# Patient Record
Sex: MALE | Race: WHITE | HISPANIC OR LATINO | Employment: FULL TIME | URBAN - METROPOLITAN AREA
[De-identification: names, ages, dates, MRNs, and addresses within clinical notes are randomized per-mention and may not be internally consistent; named-entity substitution may affect disease eponyms.]

---

## 2018-08-27 ENCOUNTER — HOSPITAL ENCOUNTER (EMERGENCY)
Facility: HOSPITAL | Age: 48
Discharge: HOME | End: 2018-08-27
Attending: EMERGENCY MEDICINE

## 2018-08-27 VITALS
RESPIRATION RATE: 19 BRPM | SYSTOLIC BLOOD PRESSURE: 111 MMHG | DIASTOLIC BLOOD PRESSURE: 75 MMHG | BODY MASS INDEX: 33.06 KG/M2 | WEIGHT: 205.69 LBS | HEART RATE: 76 BPM | TEMPERATURE: 98 F | HEIGHT: 66 IN | OXYGEN SATURATION: 98 %

## 2018-08-27 DIAGNOSIS — R07.9 CHEST PAIN: ICD-10-CM

## 2018-08-27 LAB
ALBUMIN SERPL BCP-MCNC: 3.8 G/DL
ALP SERPL-CCNC: 72 U/L
ALT SERPL W/O P-5'-P-CCNC: 41 U/L
ANION GAP SERPL CALC-SCNC: 13 MMOL/L
AST SERPL-CCNC: 18 U/L
BASOPHILS # BLD AUTO: 0.05 K/UL
BASOPHILS NFR BLD: 0.5 %
BILIRUB SERPL-MCNC: 0.6 MG/DL
BUN SERPL-MCNC: 13 MG/DL
CALCIUM SERPL-MCNC: 9.4 MG/DL
CHLORIDE SERPL-SCNC: 106 MMOL/L
CO2 SERPL-SCNC: 20 MMOL/L
CREAT SERPL-MCNC: 0.8 MG/DL
DIFFERENTIAL METHOD: NORMAL
EOSINOPHIL # BLD AUTO: 0.5 K/UL
EOSINOPHIL NFR BLD: 4.8 %
ERYTHROCYTE [DISTWIDTH] IN BLOOD BY AUTOMATED COUNT: 13.5 %
EST. GFR  (AFRICAN AMERICAN): >60 ML/MIN/1.73 M^2
EST. GFR  (NON AFRICAN AMERICAN): >60 ML/MIN/1.73 M^2
GLUCOSE SERPL-MCNC: 140 MG/DL
HCT VFR BLD AUTO: 42 %
HGB BLD-MCNC: 14.5 G/DL
LYMPHOCYTES # BLD AUTO: 4.5 K/UL
LYMPHOCYTES NFR BLD: 41.3 %
MCH RBC QN AUTO: 29 PG
MCHC RBC AUTO-ENTMCNC: 34.5 G/DL
MCV RBC AUTO: 84 FL
MONOCYTES # BLD AUTO: 0.8 K/UL
MONOCYTES NFR BLD: 7 %
NEUTROPHILS # BLD AUTO: 5.1 K/UL
NEUTROPHILS NFR BLD: 46.4 %
PLATELET # BLD AUTO: 273 K/UL
PMV BLD AUTO: 9.2 FL
POTASSIUM SERPL-SCNC: 3.5 MMOL/L
PROT SERPL-MCNC: 7 G/DL
RBC # BLD AUTO: 5 M/UL
SODIUM SERPL-SCNC: 139 MMOL/L
TROPONIN I SERPL DL<=0.01 NG/ML-MCNC: <0.006 NG/ML
WBC # BLD AUTO: 10.95 K/UL

## 2018-08-27 PROCEDURE — 80053 COMPREHEN METABOLIC PANEL: CPT

## 2018-08-27 PROCEDURE — 93010 ELECTROCARDIOGRAM REPORT: CPT | Mod: ,,, | Performed by: INTERNAL MEDICINE

## 2018-08-27 PROCEDURE — 93005 ELECTROCARDIOGRAM TRACING: CPT

## 2018-08-27 PROCEDURE — 36415 COLL VENOUS BLD VENIPUNCTURE: CPT

## 2018-08-27 PROCEDURE — 25000003 PHARM REV CODE 250: Performed by: EMERGENCY MEDICINE

## 2018-08-27 PROCEDURE — 85025 COMPLETE CBC W/AUTO DIFF WBC: CPT

## 2018-08-27 PROCEDURE — 84484 ASSAY OF TROPONIN QUANT: CPT

## 2018-08-27 PROCEDURE — 99284 EMERGENCY DEPT VISIT MOD MDM: CPT | Mod: 25

## 2018-08-27 RX ORDER — ASPIRIN 325 MG
325 TABLET ORAL
Status: COMPLETED | OUTPATIENT
Start: 2018-08-27 | End: 2018-08-27

## 2018-08-27 RX ADMIN — ASPIRIN 325 MG ORAL TABLET 325 MG: 325 PILL ORAL at 02:08

## 2018-08-27 NOTE — ED NOTES
"Was seen at [facility] on Wednesday for c/o chest "tingling" and was diagnosed with URI. This evening began experiencing same symptoms but now with tingling down left arm. Bilat hand grasps strong.  Denies nausea diaphoresis or SOB. NSR on monitor. EKG in progress.  Dr. Alas at bedside.  "

## 2018-08-27 NOTE — ED PROVIDER NOTES
"SCRIBE #1 NOTE: I, Angie Phillip, am scribing for, and in the presence of, Krista Alas MD. I have scribed the entire note.      History      Chief Complaint   Patient presents with    States chest tingling     States for 2 days has had left sided tingling to chest, denies SOB, n/v. States left arm got tingly tonight as well.  Also had tingling pain to right thigh        Review of patient's allergies indicates:  No Known Allergies     HPI   HPI    8/27/2018, 1:56 AM   History obtained from the patient      History of Present Illness: Lv Joyce is a 48 y.o. male patient who presents to the Emergency Department for L sided chest tingling which onset gradually a few days ago. Patient reports being evaluated at [facility] 5 days ago for similar sxs and received steroid injection. Symptoms are constant and moderate in severity. The patient describes the sxs as "funny feeling". Sxs mitigated with movement. No exacerbating factors reported. Patient reports L arm tingling onset tonight. Patient denies any fever, chills, SOB, diaphoresis, palpitations, extremity weakness/numbness, leg swelling, dizziness, cough, N/V, recent travel, long car rides, and all other sxs at this time. No further complaints or concerns at this time.     Arrival mode: Personal vehicle     PCP: Primary Doctor No       Past Medical History:  Past medical history reviewed not relevant      Past Surgical History:  Past surgical history reviewed not relevant      Family History:  Family history reviewed not relevant      Social History:  Social History    Social History Main Topics    Social History Main Topics    Smoking status: Unknown if ever smoked    Smokeless tobacco: Unknown if ever used    Alcohol Use: Unknown drinking history    Drug Use: Unknown if ever used    Sexual Activity: Unknown       ROS   Review of Systems   Constitutional: Negative for chills, diaphoresis and fever.   HENT: Negative for sore throat.    Respiratory: Negative for " cough and shortness of breath.    Cardiovascular: Positive for chest pain (tingling). Negative for palpitations and leg swelling.   Gastrointestinal: Negative for nausea and vomiting.   Genitourinary: Negative for dysuria.   Musculoskeletal: Negative for back pain.   Skin: Negative for rash.   Neurological: Negative for dizziness, weakness and numbness.        (+) L arm tingling   Hematological: Does not bruise/bleed easily.   All other systems reviewed and are negative.      Physical Exam      Initial Vitals [08/27/18 0142]   BP Pulse Resp Temp SpO2   (!) 155/99 82 16 97.6 °F (36.4 °C) 99 %      MAP       --          Physical Exam  Nursing Notes and Vital Signs Reviewed.  Constitutional: Patient is in no acute distress. Well-developed and well-nourished.  Head: Atraumatic. Normocephalic.  Eyes: PERRL. EOM intact. Conjunctivae are not pale. No scleral icterus.  ENT: Mucous membranes are moist. Oropharynx is clear and symmetric.    Neck: Supple. Full ROM. No lymphadenopathy.  Cardiovascular: Regular rate. Regular rhythm. No murmurs, rubs, or gallops. Distal pulses are 2+ and symmetric.  Pulmonary/Chest: No respiratory distress. Clear to auscultation bilaterally. No wheezing or rales.  Abdominal: Soft and non-distended.  There is no tenderness.  No rebound, guarding, or rigidity. Good bowel sounds.  Genitourinary: No CVA tenderness  Musculoskeletal: Moves all extremities. No obvious deformities. No edema. No calf tenderness.  Skin: Warm and dry.  Neurological:  Alert, awake, and appropriate.  Normal speech.  No acute focal neurological deficits are appreciated.  Psychiatric: Normal affect. Good eye contact. Appropriate in content.    ED Course    Procedures  ED Vital Signs:  Vitals:    08/27/18 0142 08/27/18 0316 08/27/18 0331 08/27/18 0335   BP: (!) 155/99 123/86  111/75   Pulse: 82 74  76   Resp: 16 20 19    Temp: 97.6 °F (36.4 °C)      TempSrc: Oral      SpO2: 99% 98%  98%   Weight: 93.3 kg (205 lb 11 oz)     "  Height: 5' 6" (1.676 m)       08/27/18 0339   BP:    Pulse:    Resp:    Temp: 98.1 °F (36.7 °C)   TempSrc:    SpO2:    Weight:    Height:        Abnormal Lab Results:  Labs Reviewed   COMPREHENSIVE METABOLIC PANEL - Abnormal; Notable for the following components:       Result Value    CO2 20 (*)     Glucose 140 (*)     All other components within normal limits   CBC W/ AUTO DIFFERENTIAL   TROPONIN I        All Lab Results:  Results for orders placed or performed during the hospital encounter of 08/27/18   CBC auto differential   Result Value Ref Range    WBC 10.95 3.90 - 12.70 K/uL    RBC 5.00 4.60 - 6.20 M/uL    Hemoglobin 14.5 14.0 - 18.0 g/dL    Hematocrit 42.0 40.0 - 54.0 %    MCV 84 82 - 98 fL    MCH 29.0 27.0 - 31.0 pg    MCHC 34.5 32.0 - 36.0 g/dL    RDW 13.5 11.5 - 14.5 %    Platelets 273 150 - 350 K/uL    MPV 9.2 9.2 - 12.9 fL    Gran # (ANC) 5.1 1.8 - 7.7 K/uL    Lymph # 4.5 1.0 - 4.8 K/uL    Mono # 0.8 0.3 - 1.0 K/uL    Eos # 0.5 0.0 - 0.5 K/uL    Baso # 0.05 0.00 - 0.20 K/uL    Gran% 46.4 38.0 - 73.0 %    Lymph% 41.3 18.0 - 48.0 %    Mono% 7.0 4.0 - 15.0 %    Eosinophil% 4.8 0.0 - 8.0 %    Basophil% 0.5 0.0 - 1.9 %    Differential Method Automated    Comprehensive metabolic panel   Result Value Ref Range    Sodium 139 136 - 145 mmol/L    Potassium 3.5 3.5 - 5.1 mmol/L    Chloride 106 95 - 110 mmol/L    CO2 20 (L) 23 - 29 mmol/L    Glucose 140 (H) 70 - 110 mg/dL    BUN, Bld 13 6 - 20 mg/dL    Creatinine 0.8 0.5 - 1.4 mg/dL    Calcium 9.4 8.7 - 10.5 mg/dL    Total Protein 7.0 6.0 - 8.4 g/dL    Albumin 3.8 3.5 - 5.2 g/dL    Total Bilirubin 0.6 0.1 - 1.0 mg/dL    Alkaline Phosphatase 72 55 - 135 U/L    AST 18 10 - 40 U/L    ALT 41 10 - 44 U/L    Anion Gap 13 8 - 16 mmol/L    eGFR if African American >60 >60 mL/min/1.73 m^2    eGFR if non African American >60 >60 mL/min/1.73 m^2   Troponin I #1   Result Value Ref Range    Troponin I <0.006 0.000 - 0.026 ng/mL       Imaging Results:  Imaging Results          " X-Ray Chest AP Portable (In process)                Ordered, reviewed, and independently interpreted by the ED provider.  Study: X-ray Chest  Findings: NAF    The EKG was ordered, reviewed, and independently interpreted by the ED provider.  Interpretation time: 0200  Rate: 69 BPM  Rhythm: normal sinus rhythm  Interpretation: Normal ECG. No acute ST & T wave abnormality. No STEMI.         The Emergency Provider reviewed the vital signs and test results, which are outlined above.    ED Discussion     3:00 AM: Reassessed pt at this time. Discussed with pt all pertinent ED information and results. Discussed pt dx and plan of tx. Gave pt all f/u and return to the ED instructions. All questions and concerns were addressed at this time. Pt expresses understanding of information and instructions, and is comfortable with plan to discharge. Pt is stable for discharge.  Discussed with patient to f/u with PCP for re-evaluation.    I have discussed with patient and/or family/caretaker chest pain precautions, specifically to return for worsening chest pain, shortness of breath, fever, or any concern.  I have low suspicion for cardiopulmonary, vascular, infectious, respiratory, or other emergent medical condition based on my evaluation in the ED.    I discussed with patient and/or family/caretaker that evaluation in the ED does not suggest any emergent or life threatening medical conditions requiring immediate intervention beyond what was provided in the ED, and I believe patient is safe for discharge.  Regardless, an unremarkable evaluation in the ED does not preclude the development or presence of a serious of life threatening condition. As such, patient was instructed to return immediately for any worsening or change in current symptoms.      ED Medication(s):  Medications   aspirin tablet 325 mg (325 mg Oral Given 8/27/18 0216)       There are no discharge medications for this patient.      Follow-up Information     [facility] In 2 days.    Contact information:  [address] [facility] [address]  [phone]             [facility]    Specialty:  Emergency Medicine  Why:  As needed, If symptoms worsen  Contact information:  [address] [facility] [address] [phone]                   Medical Decision Making    Medical Decision Making:   Clinical Tests:   Lab Tests: Ordered and Reviewed  Radiological Study: Ordered and Reviewed  Medical Tests: Ordered and Reviewed           Scribe Attestation:   Scribe #1: I performed the above scribed service and the documentation accurately describes the services I performed. I attest to the accuracy of the note.    Attending:   Physician Attestation Statement for Scribe #1: I, Krista Alas MD, personally performed the services described in this documentation, as scribed by Angie Fisher, in my presence, and it is both accurate and complete.          Clinical Impression       ICD-10-CM ICD-9-CM   1. Chest pain R07.9 786.50       Disposition:   Disposition: Discharged  Condition: Stable         Krista Alas MD  08/27/18 0604

## 2018-10-12 ENCOUNTER — HOSPITAL ENCOUNTER (EMERGENCY)
Facility: HOSPITAL | Age: 48
Discharge: HOME | End: 2018-10-12
Attending: EMERGENCY MEDICINE

## 2018-10-12 VITALS
HEIGHT: 66 IN | DIASTOLIC BLOOD PRESSURE: 80 MMHG | HEART RATE: 80 BPM | SYSTOLIC BLOOD PRESSURE: 143 MMHG | RESPIRATION RATE: 18 BRPM | TEMPERATURE: 98 F | WEIGHT: 208.13 LBS | OXYGEN SATURATION: 98 % | BODY MASS INDEX: 33.45 KG/M2

## 2018-10-12 DIAGNOSIS — R05.9 COUGH: ICD-10-CM

## 2018-10-12 DIAGNOSIS — I10 HTN (HYPERTENSION): Primary | ICD-10-CM

## 2018-10-12 PROCEDURE — 99284 EMERGENCY DEPT VISIT MOD MDM: CPT | Mod: 25

## 2018-10-12 PROCEDURE — 93010 ELECTROCARDIOGRAM REPORT: CPT | Mod: ,,, | Performed by: INTERNAL MEDICINE

## 2018-10-13 NOTE — ED PROVIDER NOTES
SCRIBE #1 NOTE: I, Gracia Pinzon, am scribing for, and in the presence of, MARY Moise. I have scribed the entire note.      History      Chief Complaint   Patient presents with    Hypertension     Pt states that earlier this week he was seen at urgent care for HTN due to new toothpaste he tried. Pt states that he checked his BP today at 1830 and it was 146/112       Review of patient's allergies indicates:  No Known Allergies     HPI   HPI    10/12/2018, 8:15 PM   History obtained from the patient      History of Present Illness: Lv oJyce is a 48 y.o. male patient who presents to the Emergency Department for evaluation of elevated BP which onset gradually today. Patient states he took his BP throughout the day when it continued to increase. Symptoms are intermittent and mild in severity. No mitigating or exacerbating factors reported. Associated sxs include cough and mild chest tightness. Patient denies any fatigue, HA, palpitations, congestion, fever, diaphoresis, N/V/D, dizziness, blurred vision, CP, SOB, and all other sxs at this time. No prior Tx reported. Pt notes mother and father have mild HTN. Pt is not currently on any BP medications. No further complaints or concerns at this time.     Arrival mode: Personal vehicle    PCP: Primary Doctor No       Past Medical History:  Past medical history reviewed not relevant      Past Surgical History:  Past surgical history reviewed not relevant      Family History:  Family history reviewed not relevant      Social History:  Social History    Social History Main Topics    Social History Main Topics    Smoking status: Unknown if ever smoked    Smokeless tobacco: Unknown if ever used    Alcohol Use: Unknown drinking history    Drug Use: Unknown if ever used    Sexual Activity: Unknown         ROS   Review of Systems   Constitutional: Negative for activity change, chills, diaphoresis, fatigue and fever.        (+) elevated BP     HENT:  Negative for congestion, rhinorrhea, sneezing, sore throat and trouble swallowing.    Eyes: Negative for pain.        (-) blurred vision   Respiratory: Positive for cough and chest tightness (mild). Negative for shortness of breath, wheezing and stridor.    Cardiovascular: Negative for chest pain, palpitations and leg swelling.   Gastrointestinal: Negative for abdominal distention, abdominal pain, constipation, diarrhea, nausea and vomiting.   Genitourinary: Negative for difficulty urinating, dysuria, frequency and urgency.   Musculoskeletal: Negative for arthralgias, back pain, myalgias, neck pain and neck stiffness.   Skin: Negative for pallor, rash and wound.   Neurological: Negative for dizziness, syncope, weakness, light-headedness, numbness and headaches.   Hematological: Does not bruise/bleed easily.   All other systems reviewed and are negative.    Physical Exam      Initial Vitals [10/12/18 1919]   BP Pulse Resp Temp SpO2   (!) 158/80 90 18 97.8 °F (36.6 °C) 97 %      MAP       --          Physical Exam  Nursing Notes and Vital Signs Reviewed.  Constitutional: Patient is in no acute distress. Well-developed and well-nourished.  Head: Atraumatic. Normocephalic.  Eyes: PERRL. EOM intact. Conjunctivae are not pale. No scleral icterus.  ENT: Mucous membranes are moist. Oropharynx is clear and symmetric.    Neck: Supple. Full ROM. No lymphadenopathy.  Cardiovascular: Regular rate. Regular rhythm. No murmurs, rubs, or gallops. Distal pulses are 2+ and symmetric.  Pulmonary/Chest: No respiratory distress. Clear to auscultation bilaterally. No wheezing or rales.  Abdominal: Soft and non-distended.  There is no tenderness.  No rebound, guarding, or rigidity. Good bowel sounds.  Genitourinary: No CVA tenderness  Musculoskeletal: Moves all extremities. No obvious deformities. No edema. No calf tenderness.  Skin: Warm and dry.  Neurological:  Alert, awake, and appropriate.  Normal speech.  No acute focal neurological  "deficits are appreciated.  Psychiatric: Normal affect. Good eye contact. Appropriate in content.    ED Course    Procedures  ED Vital Signs:  Vitals:    10/12/18 1919   BP: (!) 158/80   Pulse: 90   Resp: 18   Temp: 97.8 °F (36.6 °C)   TempSrc: Oral   SpO2: 97%   Weight: 94.4 kg (208 lb 1.8 oz)   Height: 5' 6" (1.676 m)       Abnormal Lab Results:  Labs Reviewed - No data to display     All Lab Results:  None    Imaging Results:  Imaging Results          X-Ray Chest PA And Lateral (Final result)  Result time 10/12/18 21:21:32    Final result by Sp Bailey III, MD (10/12/18 21:21:32)                 Impression:      No acute disease identified in the chest.      Electronically signed by: Sp Bailey MD  Date:    10/12/2018  Time:    21:21             Narrative:    EXAMINATION:  XR CHEST PA AND LATERAL    CLINICAL HISTORY:  Cough    COMPARISON:  08/27/2018    FINDINGS:  The cardiomediastinal silhouette is within normal limits.  The lungs appear clear of active disease.  No acute osseous abnormality identified.                               The EKG was ordered, reviewed, and independently interpreted by the ED provider.  Interpretation time: 19:40  Rate: 79 BPM  Rhythm: normal sinus rhythm  Interpretation: Normal ECG. No STEMI.  When compared to EKG performed 27-AUG-2018 01:59, there are no significant changes.             The Emergency Provider reviewed the vital signs and test results, which are outlined above.    ED Discussion     9:26 PM: Reassessed pt at this time.  Pt states his condition has improved at this time. Discussed with pt all pertinent ED information and results. Discussed pt dx of HTN and plan of tx. Gave pt all f/u and return to the ED instructions. All questions and concerns were addressed at this time. Pt expresses understanding of information and instructions, and is comfortable with plan to discharge. Pt is stable for discharge.    Pre-hypertension/Hypertension: The pt has been informed " that they may have pre-hypertension or hypertension based on a blood pressure reading in the ED. I recommend that the pt call the PCP listed on their discharge instructions or a physician of their choice this week to arrange f/u for further evaluation of possible pre-hypertension or hypertension.     I discussed with patient and/or family/caretaker that evaluation in the ED does not suggest any emergent or life threatening medical conditions requiring immediate intervention beyond what was provided in the ED, and I believe patient is safe for discharge.  Regardless, an unremarkable evaluation in the ED does not preclude the development or presence of a serious of life threatening condition. As such, patient was instructed to return immediately for any worsening or change in current symptoms.      ED Medication(s):  Medications - No data to display    Follow-up Information     PCP. Go in 2 days.              There are no discharge medications for this patient.      Medical Decision Making    Medical Decision Making:   Clinical Tests:   Radiological Study: Ordered and Reviewed  Medical Tests: Ordered and Reviewed     Additional MDM:   Hypertension: The patient has hypertension (no treatment required at this time). The patient's condition was felt to be stable.        Scribe Attestation:   Scribe #1: I performed the above scribed service and the documentation accurately describes the services I performed. I attest to the accuracy of the note.    Attending:   Physician Attestation Statement for Scribe #1: I, MARY Moise, personally performed the services described in this documentation, as scribed by Gracia Pinzon, in my presence, and it is both accurate and complete.          Clinical Impression       ICD-10-CM ICD-9-CM   1. HTN (hypertension) I10 401.9   2. Cough R05 786.2       Disposition:   Disposition: Discharged  Condition: Stable         MARY Johns  10/12/18 2131

## 2018-10-18 ENCOUNTER — HOSPITAL ENCOUNTER (EMERGENCY)
Facility: HOSPITAL | Age: 48
Discharge: HOME | End: 2018-10-18
Attending: EMERGENCY MEDICINE

## 2018-10-18 ENCOUNTER — OFFICE VISIT (OUTPATIENT)
Dept: INTERNAL MEDICINE | Facility: CLINIC | Age: 48
End: 2018-10-18

## 2018-10-18 VITALS
HEART RATE: 88 BPM | DIASTOLIC BLOOD PRESSURE: 81 MMHG | WEIGHT: 205.69 LBS | HEIGHT: 66 IN | RESPIRATION RATE: 19 BRPM | OXYGEN SATURATION: 97 % | BODY MASS INDEX: 33.06 KG/M2 | SYSTOLIC BLOOD PRESSURE: 132 MMHG | TEMPERATURE: 98 F

## 2018-10-18 VITALS
WEIGHT: 207 LBS | DIASTOLIC BLOOD PRESSURE: 74 MMHG | BODY MASS INDEX: 31.37 KG/M2 | HEART RATE: 100 BPM | SYSTOLIC BLOOD PRESSURE: 122 MMHG | TEMPERATURE: 98 F | OXYGEN SATURATION: 98 % | HEIGHT: 68 IN | RESPIRATION RATE: 20 BRPM

## 2018-10-18 DIAGNOSIS — R07.9 CHEST PAIN: Primary | ICD-10-CM

## 2018-10-18 DIAGNOSIS — R07.9 CHEST PAIN, UNSPECIFIED TYPE: Primary | ICD-10-CM

## 2018-10-18 DIAGNOSIS — F17.200 SMOKING: ICD-10-CM

## 2018-10-18 DIAGNOSIS — Z13.1 SCREENING FOR DIABETES MELLITUS (DM): ICD-10-CM

## 2018-10-18 DIAGNOSIS — Z13.220 SCREENING FOR HYPERLIPIDEMIA: ICD-10-CM

## 2018-10-18 DIAGNOSIS — Z72.0 TOBACCO ABUSE: ICD-10-CM

## 2018-10-18 LAB
ALBUMIN SERPL BCP-MCNC: 4.1 G/DL
ALP SERPL-CCNC: 66 U/L
ALT SERPL W/O P-5'-P-CCNC: 37 U/L
ANION GAP SERPL CALC-SCNC: 12 MMOL/L
AST SERPL-CCNC: 19 U/L
BASOPHILS # BLD AUTO: 0.05 K/UL
BASOPHILS NFR BLD: 0.5 %
BILIRUB SERPL-MCNC: 0.8 MG/DL
BNP SERPL-MCNC: <10 PG/ML
BUN SERPL-MCNC: 10 MG/DL
CALCIUM SERPL-MCNC: 9.2 MG/DL
CHLORIDE SERPL-SCNC: 104 MMOL/L
CO2 SERPL-SCNC: 22 MMOL/L
CREAT SERPL-MCNC: 0.9 MG/DL
DIFFERENTIAL METHOD: NORMAL
EOSINOPHIL # BLD AUTO: 0.3 K/UL
EOSINOPHIL NFR BLD: 3.2 %
ERYTHROCYTE [DISTWIDTH] IN BLOOD BY AUTOMATED COUNT: 13.3 %
EST. GFR  (AFRICAN AMERICAN): >60 ML/MIN/1.73 M^2
EST. GFR  (NON AFRICAN AMERICAN): >60 ML/MIN/1.73 M^2
GLUCOSE SERPL-MCNC: 107 MG/DL
HCT VFR BLD AUTO: 43.7 %
HGB BLD-MCNC: 14.9 G/DL
LYMPHOCYTES # BLD AUTO: 3.9 K/UL
LYMPHOCYTES NFR BLD: 38.9 %
MCH RBC QN AUTO: 28.8 PG
MCHC RBC AUTO-ENTMCNC: 34.1 G/DL
MCV RBC AUTO: 84 FL
MONOCYTES # BLD AUTO: 0.9 K/UL
MONOCYTES NFR BLD: 9.2 %
NEUTROPHILS # BLD AUTO: 4.8 K/UL
NEUTROPHILS NFR BLD: 48.2 %
PLATELET # BLD AUTO: 287 K/UL
PMV BLD AUTO: 9.3 FL
POTASSIUM SERPL-SCNC: 3.8 MMOL/L
PROT SERPL-MCNC: 7.3 G/DL
RBC # BLD AUTO: 5.18 M/UL
SODIUM SERPL-SCNC: 138 MMOL/L
TROPONIN I SERPL DL<=0.01 NG/ML-MCNC: <0.006 NG/ML
TROPONIN I SERPL DL<=0.01 NG/ML-MCNC: <0.006 NG/ML
WBC # BLD AUTO: 9.9 K/UL

## 2018-10-18 PROCEDURE — 93005 ELECTROCARDIOGRAM TRACING: CPT

## 2018-10-18 PROCEDURE — 99203 OFFICE O/P NEW LOW 30 MIN: CPT | Mod: S$PBB,,, | Performed by: FAMILY MEDICINE

## 2018-10-18 PROCEDURE — 84484 ASSAY OF TROPONIN QUANT: CPT | Mod: 91

## 2018-10-18 PROCEDURE — 80053 COMPREHEN METABOLIC PANEL: CPT

## 2018-10-18 PROCEDURE — 25000003 PHARM REV CODE 250: Performed by: EMERGENCY MEDICINE

## 2018-10-18 PROCEDURE — 99999 PR PBB SHADOW E&M-EST. PATIENT-LVL IV: CPT | Mod: PBBFAC,,, | Performed by: FAMILY MEDICINE

## 2018-10-18 PROCEDURE — 99283 EMERGENCY DEPT VISIT LOW MDM: CPT | Mod: 25,27

## 2018-10-18 PROCEDURE — 85025 COMPLETE CBC W/AUTO DIFF WBC: CPT

## 2018-10-18 PROCEDURE — 83880 ASSAY OF NATRIURETIC PEPTIDE: CPT

## 2018-10-18 PROCEDURE — 93010 ELECTROCARDIOGRAM REPORT: CPT | Mod: ,,, | Performed by: INTERNAL MEDICINE

## 2018-10-18 PROCEDURE — 84484 ASSAY OF TROPONIN QUANT: CPT

## 2018-10-18 PROCEDURE — 99214 OFFICE O/P EST MOD 30 MIN: CPT | Mod: PBBFAC,25 | Performed by: FAMILY MEDICINE

## 2018-10-18 RX ORDER — ASPIRIN 325 MG
325 TABLET ORAL
Status: COMPLETED | OUTPATIENT
Start: 2018-10-18 | End: 2018-10-18

## 2018-10-18 RX ORDER — PANTOPRAZOLE SODIUM 20 MG/1
20 TABLET, DELAYED RELEASE ORAL DAILY
Qty: 30 TABLET | Refills: 0 | Status: SHIPPED | OUTPATIENT
Start: 2018-10-18 | End: 2018-11-08

## 2018-10-18 RX ADMIN — ASPIRIN 325 MG ORAL TABLET 325 MG: 325 PILL ORAL at 06:10

## 2018-10-18 NOTE — ED PROVIDER NOTES
"SCRIBE #1 NOTE: I, Chioma Wray, am scribing for, and in the presence of, Krista Alas MD. I have scribed the HPI, ROS, and PE.     SCRIBE #2 NOTE: I, Tim Herrera, am scribing for, and in the presence of,  SURESH Leal MD. I have scribed the remaining portions of the note not scribed by Scribe #1.      History     Chief Complaint   Patient presents with    Chest Pain     chest pain, muscle weakness in LE     Review of patient's allergies indicates:  No Known Allergies      History of Present Illness     HPI    10/18/2018, 5:31 AM  History obtained from the patient      History of Present Illness: Lv Joyce is a 48 y.o. male patient who presents to the Emergency Department for evaluation of sensation of numbness. Pt reports that he awoke with generalized, mild to moderate numbness to the chest approximately 1.5 hours PTA, at 4 AM. He decided to get out of bed and walk his dog. After a short distance, he began to experience "a sensation like ice running down" the sinuses, BLEs, and BUEs. He took an ASA and came to the ED for evaluation. Pt reports that the sensation of numbness is now improved and is alleviated with laying down in his ED bed. Pt denies/does not report SOB, diaphoresis, palpitations, leg pain/swelling, dizziness, lightheadedness, syncope, cough, or N/V.     Arrival mode: Personal vehicle    PCP: Primary Doctor No        Past Medical History:  History reviewed. No pertinent past medical history.    Past Surgical History:  History reviewed. No pertinent surgical history.      Family History:  History reviewed. No pertinent family history.    Social History:  Social History     Tobacco Use    Smoking status: Current Every Day Smoker   Substance and Sexual Activity    Alcohol use: No     Frequency: Never    Drug use: No    Sexual activity: Unknown      Review of Systems     Review of Systems   Constitutional: Negative for activity change, chills, diaphoresis and fever.   HENT: " Negative for congestion, rhinorrhea, sneezing, sore throat and trouble swallowing.    Eyes: Negative for pain.   Respiratory: Negative for cough, chest tightness, shortness of breath, wheezing and stridor.    Cardiovascular: Negative for palpitations and leg swelling.   Gastrointestinal: Negative for abdominal distention, abdominal pain, blood in stool, constipation, diarrhea, nausea and vomiting.   Genitourinary: Negative for difficulty urinating, dysuria, frequency and urgency.   Musculoskeletal: Negative for arthralgias, back pain, myalgias, neck pain and neck stiffness.        Negative for leg pain   Skin: Negative for pallor, rash and wound.   Neurological: Positive for numbness (to the chest, BUEs, BLEs). Negative for dizziness, tremors, seizures, syncope, facial asymmetry, speech difficulty, weakness, light-headedness and headaches.   Hematological: Does not bruise/bleed easily.   All other systems reviewed and are negative.     Physical Exam     Initial Vitals [10/18/18 0514]   BP Pulse Resp Temp SpO2   (!) 145/88 77 19 98.1 °F (36.7 °C) 98 %      MAP       --          Physical Exam  Nursing Notes and Vital Signs Reviewed.  Constitutional: Patient is in no acute distress. Well-developed and well-nourished.  Head: Atraumatic. Normocephalic.  Eyes: PERRL. EOM intact. Conjunctivae are not pale. No scleral icterus.  ENT: Mucous membranes are moist. Oropharynx is clear and symmetric.    Neck: Supple. Full ROM. No lymphadenopathy.  Cardiovascular: Regular rate. Regular rhythm. No murmurs, rubs, or gallops. Distal pulses are 2+ and symmetric.  Pulmonary/Chest: No respiratory distress. Clear to auscultation bilaterally. No wheezing or rales.  Abdominal: Soft and non-distended.  There is no tenderness.  No rebound, guarding, or rigidity. Good bowel sounds.  Musculoskeletal: Moves all extremities. No obvious deformities. No edema. No calf tenderness.  Skin: Warm and dry.  Neurological: Patient is alert and oriented to  "person, place and time. Pupils ERRL and EOM normal. Cranial nerves II-XII are intact. Strength is full bilaterally; it is equal and 5/5 in bilateral upper and lower extremities. There is no pronator drift of outstretched arms. Light touch sense is intact. Speech is clear and normal. DTRs symmetric. No acute focal neurological deficits noted.  Psychiatric: Normal affect. Good eye contact. Appropriate in content.     ED Course   Procedures  ED Vital Signs:  Vitals:    10/18/18 0514 10/18/18 0532 10/18/18 0602 10/18/18 0632   BP: (!) 145/88  125/75 123/76   Pulse: 77 80 78 79   Resp: 19  15 15   Temp: 98.1 °F (36.7 °C)      TempSrc: Oral      SpO2: 98%  97% 97%   Weight: 93.3 kg (205 lb 11 oz)      Height: 5' 6" (1.676 m)       10/18/18 0732 10/18/18 0802 10/18/18 0902   BP: 120/75 118/72 132/81   Pulse: 73 75 88   Resp: 14 12 19   Temp:      TempSrc:      SpO2: 97% 96% 97%   Weight:      Height:          Abnormal Lab Results:  Labs Reviewed   COMPREHENSIVE METABOLIC PANEL - Abnormal; Notable for the following components:       Result Value    CO2 22 (*)     All other components within normal limits   CBC W/ AUTO DIFFERENTIAL   TROPONIN I   B-TYPE NATRIURETIC PEPTIDE   TROPONIN I        All Lab Results:  Results for orders placed or performed during the hospital encounter of 10/18/18   CBC auto differential   Result Value Ref Range    WBC 9.90 3.90 - 12.70 K/uL    RBC 5.18 4.60 - 6.20 M/uL    Hemoglobin 14.9 14.0 - 18.0 g/dL    Hematocrit 43.7 40.0 - 54.0 %    MCV 84 82 - 98 fL    MCH 28.8 27.0 - 31.0 pg    MCHC 34.1 32.0 - 36.0 g/dL    RDW 13.3 11.5 - 14.5 %    Platelets 287 150 - 350 K/uL    MPV 9.3 9.2 - 12.9 fL    Gran # (ANC) 4.8 1.8 - 7.7 K/uL    Lymph # 3.9 1.0 - 4.8 K/uL    Mono # 0.9 0.3 - 1.0 K/uL    Eos # 0.3 0.0 - 0.5 K/uL    Baso # 0.05 0.00 - 0.20 K/uL    Gran% 48.2 38.0 - 73.0 %    Lymph% 38.9 18.0 - 48.0 %    Mono% 9.2 4.0 - 15.0 %    Eosinophil% 3.2 0.0 - 8.0 %    Basophil% 0.5 0.0 - 1.9 %    " Differential Method Automated    Comprehensive metabolic panel   Result Value Ref Range    Sodium 138 136 - 145 mmol/L    Potassium 3.8 3.5 - 5.1 mmol/L    Chloride 104 95 - 110 mmol/L    CO2 22 (L) 23 - 29 mmol/L    Glucose 107 70 - 110 mg/dL    BUN, Bld 10 6 - 20 mg/dL    Creatinine 0.9 0.5 - 1.4 mg/dL    Calcium 9.2 8.7 - 10.5 mg/dL    Total Protein 7.3 6.0 - 8.4 g/dL    Albumin 4.1 3.5 - 5.2 g/dL    Total Bilirubin 0.8 0.1 - 1.0 mg/dL    Alkaline Phosphatase 66 55 - 135 U/L    AST 19 10 - 40 U/L    ALT 37 10 - 44 U/L    Anion Gap 12 8 - 16 mmol/L    eGFR if African American >60 >60 mL/min/1.73 m^2    eGFR if non African American >60 >60 mL/min/1.73 m^2   Troponin I #1   Result Value Ref Range    Troponin I <0.006 0.000 - 0.026 ng/mL   B-Type natriuretic peptide (BNP)   Result Value Ref Range    BNP <10 0 - 99 pg/mL   Troponin I   Result Value Ref Range    Troponin I <0.006 0.000 - 0.026 ng/mL         Imaging Results:  Imaging Results          X-Ray Chest PA And Lateral (Final result)  Result time 10/18/18 07:46:59    Final result by GHAZALA Sherman Sr., MD (10/18/18 07:46:59)                 Impression:      1. The lungs are clear.  2. There are minimal degenerative changes in the thoracic spine.  .      Electronically signed by: Kaz Sherman MD  Date:    10/18/2018  Time:    07:46             Narrative:    EXAMINATION:  XR CHEST PA AND LATERAL    CLINICAL HISTORY:  Chest pain, unspecified    COMPARISON:  10/12/2018    FINDINGS:  The size and contour of the heart are normal. The lungs are clear. There is no pneumothorax or pleural effusion.  There are minimal degenerative changes in the thoracic spine.                                 The EKG was ordered, reviewed, and independently interpreted by the ED provider.  Interpretation time: 0522  Rate: 75 BPM  Rhythm: normal sinus rhythm  Interpretation: Normal intervals and axis. No STEMI.           The Emergency Provider reviewed the vital signs and test  results, which are outlined above.     ED Discussion     5:50 AM: Dr. Alas transfers care of pt to Dr. SURESH Leal, pending lab results.    6:19 AM: Re-evaluated pt. Pt is resting comfortably and is in no acute distress.  Pt states he has had similar sxs in the past but has never been evaluated as an out pt for his sxs. Pt reports similar sxs this weekend stating he was evaluated here and told his sxs may be connected to his blood pressure. Pt denies taking any anti-hypertensive medications. Pt denies ever having a stress test done During re-eval pt denies any sxs stating he has felt a strange sensation since arriving to ED that has woken him up but nothing else. Pt reports taking an 81mg aspiring before trying to sleep this morning.  D/w pt all pertinent results. D/w pt any concerns expressed at this time. Answered all questions. Pt expresses understanding at this time.    8:34 AM: Reassessed pt at this time. Pt is awake, alert, and in NAD. Pt states his condition has improved at this time. Discussed with pt all pertinent ED information and results. Discussed pt dx and plan of tx. Gave pt all f/u and return to the ED instructions. All questions and concerns were addressed at this time. Pt expresses understanding of information and instructions, and is comfortable with plan to discharge. Pt is stable for discharge.    I discussed with patient and/or family/caretaker that evaluation in the ED does not suggest any emergent or life threatening medical conditions requiring immediate intervention beyond what was provided in the ED, and I believe patient is safe for discharge.  Regardless, an unremarkable evaluation in the ED does not preclude the development or presence of a serious of life threatening condition. As such, patient was instructed to return immediately for any worsening or change in current symptoms.    I have discussed with patient and/or family/caretaker chest pain precautions, specifically to return  for worsening chest pain, shortness of breath, fever, or any concern.  I have low suspicion for cardiopulmonary, vascular, infectious, respiratory, or other emergent medical condition based on my evaluation in the ED.      ED Medication(s):  Medications   aspirin tablet 325 mg (325 mg Oral Given 10/18/18 0637)      Follow-up Information     [facility]. Schedule an appointment as soon as possible for a visit in 2 days.    Specialty:  Cardiology  Why:  Return to the Emergency Room, If symptoms worsen  Contact information:  [id number] [facility] [id number] [phone]                    Medical Decision Making     Medical Decision Making:   Clinical Tests:   Lab Tests: Ordered and Reviewed  Radiological Study: Ordered and Reviewed  Medical Tests: Ordered and Reviewed     Additional MDM:   Smoking Cessation: The patient is a smoker. The patient was counseled on smoking cessation for: 3 minutes. The patient was counseled on tobacco related  health complications. Appropriate patient literature was given to the patient concerning tobacco cessation.          Scribe Attestation:   Scribe #1: I performed the above scribed service and the documentation accurately describes the services I performed. I attest to the accuracy of the note. 10/18/2018 5:31 AM    Attending:   Physician Attestation Statement for Scribe #1: I, Krista Alas MD, personally performed the services described in this documentation, as scribed by Chioma Wray, in my presence, and it is both accurate and complete.       Scribe Attestation:   Scribe #2: I performed the above scribed service and the documentation accurately describes the services I performed. I attest to the accuracy of the note.    Attending Attestation:           Physician Attestation for Scribe:    Physician Attestation Statement for Scribe #2: I, SURESH Leal MD, reviewed documentation, as scribed by Tim Herrera in my presence, and it is both accurate  and complete. I also acknowledge and confirm the content of the note done by Sharmilaibchun #1.           Clinical Impression       ICD-10-CM ICD-9-CM   1. Chest pain R07.9 786.50   2. Tobacco abuse Z72.0 305.1       Disposition:   Disposition: Discharged  Condition: Stable           Deepthi Leal MD  10/18/18 7139

## 2018-10-18 NOTE — PROGRESS NOTES
Subjective:       Patient ID: Lv Joyce is a 48 y.o. male.    Chief Complaint: Establish Care (annual) and Hospital Follow Up (dull chest pain)    HPI       49 yo M    Presents to establish care    No chronic medical issues outside of allergy    No daily medication    ER visit follow up    Chest pain Issues    Smokes cigarettes -  Has smoked for 30 years - roughly pack a day for 30 years  No family history of heart problems.  No known heart attacks -   No sudden cardiac death in family    Feels a dull ache in chest  Present all time  Worsens with coffee of cigarette    Has had sensations of numbness or a feeling funny down arms    Chest pain  Has been noticing this last few weeks  Not daily - but the past week has felt more than usual  No SOB  Not exertional  A few times at work - finds he needs to get up move and feels better.  Finds worse while sitting at computer.  Improves when gets up and moves.    Finds if goes back to eating previous diet ( has recently improved quality of foods ) the chest sensation is worse.    No burping  No bloated  No funny taste in mouth  No history of heart burn   Has taken ant acids in past - did provide some relief.      ER visit x 2 recently  Labs  Troponin negative  BNP negative      Review of Systems   Constitutional: Negative for activity change and unexpected weight change.   HENT: Negative for hearing loss, rhinorrhea and trouble swallowing.    Eyes: Negative for discharge and visual disturbance.   Respiratory: Negative for chest tightness and wheezing.    Cardiovascular: Positive for chest pain. Negative for palpitations.   Gastrointestinal: Negative for blood in stool, constipation, diarrhea and vomiting.   Endocrine: Positive for polydipsia and polyuria.   Genitourinary: Negative for difficulty urinating, hematuria and urgency.   Musculoskeletal: Negative for arthralgias, joint swelling and neck pain.   Neurological: Positive for weakness. Negative for  headaches.   Psychiatric/Behavioral: Negative for confusion and dysphoric mood.       Objective:       Vitals:    10/18/18 1528   BP: 122/74   Pulse: 100   Resp: 20   Temp: 98 °F (36.7 °C)       Physical Exam   Constitutional: He is oriented to person, place, and time. He appears well-developed and well-nourished. No distress.   HENT:   Head: Normocephalic and atraumatic.   Right Ear: Hearing, tympanic membrane, external ear and ear canal normal.   Left Ear: Hearing, tympanic membrane, external ear and ear canal normal.   Nose: Nose normal. Right sinus exhibits no maxillary sinus tenderness and no frontal sinus tenderness. Left sinus exhibits no maxillary sinus tenderness and no frontal sinus tenderness.   Mouth/Throat: Uvula is midline, oropharynx is clear and moist and mucous membranes are normal.   Eyes: Conjunctivae are normal. Right eye exhibits no discharge. Left eye exhibits no discharge.   Neck: Trachea normal, normal range of motion and full passive range of motion without pain.   Cardiovascular: Normal rate, regular rhythm, normal heart sounds and intact distal pulses.   Pulmonary/Chest: Effort normal and breath sounds normal. No respiratory distress. He has no decreased breath sounds. He has no wheezes.   Abdominal: Soft. Normal appearance and bowel sounds are normal. He exhibits no distension and no mass. There is no tenderness. There is no guarding. No hernia.   Musculoskeletal: Normal range of motion. He exhibits no edema or deformity.   Lymphadenopathy:     He has no cervical adenopathy.   Neurological: He is alert and oriented to person, place, and time.   Skin: Skin is warm, dry and intact. No rash noted. No erythema. No pallor.   Psychiatric: He has a normal mood and affect. His speech is normal and behavior is normal. Thought content normal.       Assessment:       1. Chest pain, unspecified type        Plan:   Chest pain    I do not strongly suspect CAD. Troponins have been negative - non  exertional - relieved with exertion.   He does have risk factors for such.  I do not know his cholesterol. I have 2 blood sugars to review - 107 and 140 - I would like an A1c to assess Pre -DM vs DM potential. Plan on obtaining Lipid and A1c.   Plan on trial period of PPI to assess if this is GERD  He does run risk of such and feels a cigarette and a coffee would worsen symptoms.  Discussed I cannot definitively state this is not CAD related - I feel seeing a cardiologist consultation would be prudent. Cost is an issue - self pay.   Given this has been present for weeks a trial period of PPI.         No Follow-up on file.

## 2018-10-18 NOTE — PATIENT INSTRUCTIONS
Trial of Proton Pump Inhibitor    Nexium  protonix  Prevacid    These are common over the counter meds

## 2018-10-20 ENCOUNTER — LAB VISIT (OUTPATIENT)
Dept: LAB | Facility: HOSPITAL | Age: 48
End: 2018-10-20
Attending: FAMILY MEDICINE

## 2018-10-20 DIAGNOSIS — Z13.220 SCREENING FOR HYPERLIPIDEMIA: ICD-10-CM

## 2018-10-20 DIAGNOSIS — Z13.1 SCREENING FOR DIABETES MELLITUS (DM): ICD-10-CM

## 2018-10-20 LAB
CHOLEST SERPL-MCNC: 202 MG/DL
CHOLEST/HDLC SERPL: 5.5 {RATIO}
ESTIMATED AVG GLUCOSE: 117 MG/DL
HBA1C MFR BLD HPLC: 5.7 %
HDLC SERPL-MCNC: 37 MG/DL
HDLC SERPL: 18.3 %
LDLC SERPL CALC-MCNC: 131.2 MG/DL
NONHDLC SERPL-MCNC: 165 MG/DL
TRIGL SERPL-MCNC: 169 MG/DL

## 2018-10-20 PROCEDURE — 36415 COLL VENOUS BLD VENIPUNCTURE: CPT | Mod: PO

## 2018-10-20 PROCEDURE — 80061 LIPID PANEL: CPT

## 2018-10-20 PROCEDURE — 83036 HEMOGLOBIN GLYCOSYLATED A1C: CPT

## 2018-10-23 ENCOUNTER — TELEPHONE (OUTPATIENT)
Dept: INTERNAL MEDICINE | Facility: CLINIC | Age: 48
End: 2018-10-23

## 2018-10-23 NOTE — TELEPHONE ENCOUNTER
----- Message from Yo Bradford MD sent at 10/22/2018  4:44 PM CDT -----  Please call and reschedule my appt for 3 months  Tell him when his cards visit is.    Called discussed  ASCVD 7.8  Should be on statin  Wants lifestyle attempt  Drastically improving diet    Will allow for 3 month trial then recheck

## 2018-11-01 ENCOUNTER — OFFICE VISIT (OUTPATIENT)
Dept: CARDIOLOGY | Facility: CLINIC | Age: 48
End: 2018-11-01
Payer: MEDICAID

## 2018-11-01 ENCOUNTER — LAB VISIT (OUTPATIENT)
Dept: LAB | Facility: HOSPITAL | Age: 48
End: 2018-11-01
Attending: INTERNAL MEDICINE
Payer: MEDICAID

## 2018-11-01 VITALS
HEIGHT: 68 IN | BODY MASS INDEX: 31.04 KG/M2 | WEIGHT: 204.81 LBS | HEART RATE: 72 BPM | SYSTOLIC BLOOD PRESSURE: 118 MMHG | DIASTOLIC BLOOD PRESSURE: 84 MMHG

## 2018-11-01 DIAGNOSIS — R42 LIGHTHEADEDNESS: ICD-10-CM

## 2018-11-01 DIAGNOSIS — R07.89 OTHER CHEST PAIN: Primary | ICD-10-CM

## 2018-11-01 DIAGNOSIS — E78.49 OTHER HYPERLIPIDEMIA: ICD-10-CM

## 2018-11-01 DIAGNOSIS — R07.89 OTHER CHEST PAIN: ICD-10-CM

## 2018-11-01 LAB — TSH SERPL DL<=0.005 MIU/L-ACNC: 1.49 UIU/ML

## 2018-11-01 PROCEDURE — 99999 PR PBB SHADOW E&M-EST. PATIENT-LVL III: CPT | Mod: PBBFAC,,, | Performed by: INTERNAL MEDICINE

## 2018-11-01 PROCEDURE — 36415 COLL VENOUS BLD VENIPUNCTURE: CPT

## 2018-11-01 PROCEDURE — 99213 OFFICE O/P EST LOW 20 MIN: CPT | Mod: PBBFAC | Performed by: INTERNAL MEDICINE

## 2018-11-01 PROCEDURE — 99204 OFFICE O/P NEW MOD 45 MIN: CPT | Mod: S$PBB,,, | Performed by: INTERNAL MEDICINE

## 2018-11-01 PROCEDURE — 84443 ASSAY THYROID STIM HORMONE: CPT

## 2018-11-01 RX ORDER — ASPIRIN 81 MG/1
81 TABLET ORAL DAILY
COMMUNITY

## 2018-11-01 NOTE — LETTER
November 1, 2018      Yo Bradford MD  [address] [place] [id number]           [facility]  [address] [place] [id number]  Phone: [phone]  Fax: [phone]          Patient: Lv Joyce   MR Number: [id number]   YOB: 1970   Date of Visit: 11/1/2018       Dear Dr. Yo Bradford:    Thank you for referring Lv Joyce to me for evaluation. Attached you will find relevant portions of my assessment and plan of care.    If you have questions, please do not hesitate to call me. I look forward to following Lv Joyce along with you.    Sincerely,    Elier Ayoub MD    Enclosure  CC:  No Recipients    If you would like to receive this communication electronically, please contact [email or web address] or [phone] to request more information on [employer] access.    For providers and/or their staff who would like to refer a patient to [facility], please contact us through our one-stop-shop provider referral line, [facility] , at [phone].    If you feel you have received this communication in error or would no longer like to receive these types of communications, please e-mail [email or web address]

## 2018-11-01 NOTE — PROGRESS NOTES
"Subjective:   Patient ID:  Lv Joyce is a 48 y.o. male who presents for cardiac consult of Chest Pain      HPI  The patient came in today for cardiac consult of Chest Pain    Referring Physician: Yo Bradford MD   Reason for consult: Chest pain    11/1/18  This is a 48 year old male pt with current medical conditions tobacco abuse, GERD, HLD presents for initial CV evaluation of chest pain.     He complains of chest pain described as numbness. Said something felt "funny". He cut back smoking a while ago but smokes a few cigs/day now.   Diet has improved. BP fluctuates.   He currently feels a fuzzy feeling. Feels maybe numbness. 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 Maybe once had gasping for air at night.   Does not snore lately, used to snore in the past.  If he is sitting down at lunch, he has to get up and move, maybe feels lightheaded. Has gained significant weight in past 5 years, less working out now.   Had outside 2D ECHO with friend that said heart was normal.     He has presented to [facility] ED twice last month for chest pain/discomfort. Neg CV workup - trops, ECGS.     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     Patient feels no sob, no leg swelling, no PND, no palpitation, no syncope, no CNS symptoms.    Patient has fairly good exercise tolerance.    Patient is compliant with medications.    FH - no CV disease; HTN in parents    History reviewed. No pertinent past medical history.    History reviewed. No pertinent surgical history.    Social History     Tobacco Use    Smoking status: Current Every Day Smoker     Packs/day: 0.25    Smokeless tobacco: Never Used   Substance Use Topics    Alcohol use: No     Frequency: Never    Drug use: No       History reviewed. No pertinent family history.       Medication List           Accurate as of 11/1/18 11:56 AM. If you have any questions, ask your nurse or " "doctor.               CONTINUE taking these medications    aspirin 81 MG EC tablet  Commonly known as:  ECOTRIN     pantoprazole 20 MG tablet  Commonly known as:  PROTONIX  Take 1 tablet (20 mg total) by mouth once daily.            Review of Systems   Constitutional: Negative.    HENT: Negative.    Eyes: Negative.    Respiratory: Negative.    Cardiovascular: Positive for chest pain.   Gastrointestinal: Positive for heartburn.   Genitourinary: Negative.    Musculoskeletal: Negative.    Skin: Negative.    Neurological: Positive for dizziness and sensory change. Negative for focal weakness, loss of consciousness and headaches.   Endo/Heme/Allergies: Negative.    Psychiatric/Behavioral: Negative.    All 12 systems otherwise negative.      Wt Readings from Last 3 Encounters:   11/01/18 92.9 kg (204 lb 12.9 oz)   10/18/18 93.9 kg (207 lb 0.2 oz)   10/18/18 93.3 kg (205 lb 11 oz)     Temp Readings from Last 3 Encounters:   10/18/18 98 °F (36.7 °C) (Tympanic)   10/18/18 98.1 °F (36.7 °C) (Oral)   10/12/18 98 °F (36.7 °C) (Oral)     BP Readings from Last 3 Encounters:   11/01/18 118/84   10/18/18 122/74   10/18/18 132/81     Pulse Readings from Last 3 Encounters:   11/01/18 72   10/18/18 100   10/18/18 88       /84 (BP Location: Left arm, Patient Position: Sitting, BP Method: Medium (Manual))   Pulse 72   Ht 5' 7.5" (1.715 m)   Wt 92.9 kg (204 lb 12.9 oz)   BMI 31.60 kg/m²     Objective:   Physical Exam   Constitutional: He is oriented to person, place, and time. He appears well-developed and well-nourished. No distress.   HENT:   Head: Normocephalic and atraumatic.   Nose: Nose normal.   Mouth/Throat: Oropharynx is clear and moist.   Eyes: Conjunctivae and EOM are normal. No scleral icterus.   Neck: Normal range of motion. Neck supple. No JVD present. No thyromegaly present.   Cardiovascular: Normal rate, regular rhythm, S1 normal and S2 normal. Exam reveals no gallop, no S3, no S4 and no friction rub.   No " murmur heard.  Pulmonary/Chest: Effort normal and breath sounds normal. No stridor. No respiratory distress. He has no wheezes. He has no rales. He exhibits no tenderness.   Abdominal: Soft. Bowel sounds are normal. He exhibits no distension and no mass. There is no tenderness. There is no rebound.   Genitourinary:   Genitourinary Comments: Deferred   Musculoskeletal: Normal range of motion. He exhibits no edema, tenderness or deformity.   Lymphadenopathy:     He has no cervical adenopathy.   Neurological: He is alert and oriented to person, place, and time. He exhibits normal muscle tone. Coordination normal.   Skin: Skin is warm and dry. No rash noted. He is not diaphoretic. No erythema. No pallor.   Psychiatric: He has a normal mood and affect. His behavior is normal. Judgment and thought content normal.   Nursing note and vitals reviewed.      Lab Results   Component Value Date     10/18/2018    K 3.8 10/18/2018     10/18/2018    CO2 22 (L) 10/18/2018    BUN 10 10/18/2018    CREATININE 0.9 10/18/2018     10/18/2018    HGBA1C 5.7 (H) 10/20/2018    AST 19 10/18/2018    ALT 37 10/18/2018    ALBUMIN 4.1 10/18/2018    PROT 7.3 10/18/2018    BILITOT 0.8 10/18/2018    WBC 9.90 10/18/2018    HGB 14.9 10/18/2018    HCT 43.7 10/18/2018    MCV 84 10/18/2018     10/18/2018    CHOL 202 (H) 10/20/2018    HDL 37 (L) 10/20/2018    LDLCALC 131.2 10/20/2018    TRIG 169 (H) 10/20/2018    BNP <10 10/18/2018     Assessment:      1. Other chest pain    2. Lightheadedness    3. Other hyperlipidemia        Plan:   1. Chest pain  - exercise stress test  - 2D ECHO reported normal per pt  - discussed non cardiac causes of CP - cont PPI    2. Ligtheadedness  - TSH, ETT    3. HLD  - Mediterranean diet    4. Obesity  - needs weight loss    Thank you for allowing me to participate in this patient's care. Please do not hesitate to contact me with any questions or concerns. Consult note has been forwarded to the  referral physician.

## 2018-11-08 ENCOUNTER — CLINICAL SUPPORT (OUTPATIENT)
Dept: CARDIOLOGY | Facility: CLINIC | Age: 48
End: 2018-11-08
Attending: INTERNAL MEDICINE
Payer: MEDICAID

## 2018-11-08 ENCOUNTER — TELEPHONE (OUTPATIENT)
Dept: CARDIOLOGY | Facility: CLINIC | Age: 48
End: 2018-11-08

## 2018-11-08 DIAGNOSIS — R07.9 CHEST PAIN, UNSPECIFIED TYPE: ICD-10-CM

## 2018-11-08 DIAGNOSIS — R07.89 OTHER CHEST PAIN: ICD-10-CM

## 2018-11-08 DIAGNOSIS — R42 LIGHTHEADEDNESS: ICD-10-CM

## 2018-11-08 DIAGNOSIS — G47.39 OTHER SLEEP APNEA: Primary | ICD-10-CM

## 2018-11-08 LAB — DIASTOLIC DYSFUNCTION: NO

## 2018-11-08 PROCEDURE — 93017 CV STRESS TEST TRACING ONLY: CPT | Mod: PBBFAC | Performed by: INTERNAL MEDICINE

## 2018-11-08 PROCEDURE — 93018 CV STRESS TEST I&R ONLY: CPT | Mod: S$PBB,,, | Performed by: INTERNAL MEDICINE

## 2018-11-08 PROCEDURE — 93016 CV STRESS TEST SUPVJ ONLY: CPT | Mod: S$PBB,,, | Performed by: INTERNAL MEDICINE

## 2018-11-08 RX ORDER — ATORVASTATIN CALCIUM 40 MG/1
40 TABLET, FILM COATED ORAL DAILY
Qty: 30 TABLET | Refills: 11 | Status: SHIPPED | OUTPATIENT
Start: 2018-11-08 | End: 2019-11-25 | Stop reason: SDUPTHER

## 2018-11-08 RX ORDER — PANTOPRAZOLE SODIUM 40 MG/1
40 TABLET, DELAYED RELEASE ORAL DAILY
Qty: 30 TABLET | Refills: 3 | Status: SHIPPED | OUTPATIENT
Start: 2018-11-08 | End: 2019-12-23 | Stop reason: SDUPTHER

## 2018-11-08 RX ORDER — METOPROLOL TARTRATE 25 MG/1
25 TABLET, FILM COATED ORAL 2 TIMES DAILY
Qty: 60 TABLET | Refills: 11 | Status: SHIPPED | OUTPATIENT
Start: 2018-11-08 | End: 2019-11-29 | Stop reason: SDUPTHER

## 2018-11-08 RX ORDER — NITROGLYCERIN 0.4 MG/1
0.4 TABLET SUBLINGUAL EVERY 5 MIN PRN
Qty: 30 TABLET | Refills: 0 | Status: SHIPPED | OUTPATIENT
Start: 2018-11-08 | End: 2019-12-23

## 2018-11-08 NOTE — TELEPHONE ENCOUNTER
----- Message from Ela Wheatley sent at 11/8/2018  4:18 PM CST -----  Pt at [phone]//states he had spoke with your office earlier but  is calling again to let you know how he is feeling right now and also calling regarding medication//please call asa//jose antonio/tiffanie

## 2018-11-08 NOTE — TELEPHONE ENCOUNTER
Pt presented today for Exercise treadmill stress test. At peak exercise he had 2mm ST depression in anterior/ lateral & inferior leads. No CP. Resolved in recovery.

## 2018-11-08 NOTE — TELEPHONE ENCOUNTER
Called to speak with pt but was unable to contact. Was not able to leave message for pt mail box full.

## 2018-11-08 NOTE — H&P (VIEW-ONLY)
Discussed with pt regarding abnormal ECG stress test, will start Metoprolol 25 BID, Lipitor 40mg, PRN NTG; cont asa 81mg, increase protonix to 40 mg daily.  Refer for sleep study as pt has episodes of gasping for air at night along with fatigue daytime and recent weight gain. Discussed LH if symptoms progress, will f/u in clinic soon to re-evaluate.     Elier Ayoub MD  Cardiovascular Disease  [facility], [place]  [phone] (P)

## 2018-11-08 NOTE — PROGRESS NOTES
Discussed with pt regarding abnormal ECG stress test, will start Metoprolol 25 BID, Lipitor 40mg, PRN NTG; cont asa 81mg, increase protonix to 40 mg daily.  Refer for sleep study as pt has episodes of gasping for air at night along with fatigue daytime and recent weight gain. Discussed LH if symptoms progress, will f/u in clinic soon to re-evaluate.     Elier Ayoub MD  Cardiovascular Disease  [facility], [place]  [phone] (P)

## 2018-11-09 ENCOUNTER — HOSPITAL ENCOUNTER (OUTPATIENT)
Facility: HOSPITAL | Age: 48
Discharge: HOME | End: 2018-11-09
Attending: EMERGENCY MEDICINE | Admitting: FAMILY MEDICINE
Payer: MEDICAID

## 2018-11-09 ENCOUNTER — TELEPHONE (OUTPATIENT)
Dept: CARDIOLOGY | Facility: CLINIC | Age: 48
End: 2018-11-09

## 2018-11-09 ENCOUNTER — PATIENT MESSAGE (OUTPATIENT)
Dept: CARDIOLOGY | Facility: CLINIC | Age: 48
End: 2018-11-09

## 2018-11-09 VITALS
TEMPERATURE: 98 F | RESPIRATION RATE: 20 BRPM | HEIGHT: 67 IN | SYSTOLIC BLOOD PRESSURE: 126 MMHG | WEIGHT: 202.19 LBS | BODY MASS INDEX: 31.73 KG/M2 | OXYGEN SATURATION: 98 % | HEART RATE: 83 BPM | DIASTOLIC BLOOD PRESSURE: 80 MMHG

## 2018-11-09 DIAGNOSIS — I24.9 ACS (ACUTE CORONARY SYNDROME): ICD-10-CM

## 2018-11-09 DIAGNOSIS — R07.89 OTHER CHEST PAIN: Primary | ICD-10-CM

## 2018-11-09 DIAGNOSIS — I20.0 UNSTABLE ANGINA: Primary | ICD-10-CM

## 2018-11-09 DIAGNOSIS — R07.89 OTHER CHEST PAIN: ICD-10-CM

## 2018-11-09 DIAGNOSIS — R94.39 ABNORMAL STRESS TEST: ICD-10-CM

## 2018-11-09 DIAGNOSIS — R07.9 CHEST PAIN: ICD-10-CM

## 2018-11-09 PROBLEM — Z72.0 TOBACCO ABUSE: Status: ACTIVE | Noted: 2018-11-09

## 2018-11-09 LAB
ALBUMIN SERPL BCP-MCNC: 3.9 G/DL
ALP SERPL-CCNC: 56 U/L
ALT SERPL W/O P-5'-P-CCNC: 26 U/L
ANION GAP SERPL CALC-SCNC: 10 MMOL/L
AST SERPL-CCNC: 22 U/L
BASOPHILS # BLD AUTO: 0.03 K/UL
BASOPHILS NFR BLD: 0.4 %
BILIRUB SERPL-MCNC: 1.6 MG/DL
BILIRUB UR QL STRIP: NEGATIVE
BNP SERPL-MCNC: 12 PG/ML
BUN SERPL-MCNC: 9 MG/DL
CALCIUM SERPL-MCNC: 9.2 MG/DL
CHLORIDE SERPL-SCNC: 105 MMOL/L
CK SERPL-CCNC: 109 U/L
CLARITY UR: CLEAR
CO2 SERPL-SCNC: 21 MMOL/L
COLOR UR: YELLOW
CREAT SERPL-MCNC: 0.8 MG/DL
DIASTOLIC DYSFUNCTION: NO
DIFFERENTIAL METHOD: ABNORMAL
EOSINOPHIL # BLD AUTO: 0.1 K/UL
EOSINOPHIL NFR BLD: 1.2 %
ERYTHROCYTE [DISTWIDTH] IN BLOOD BY AUTOMATED COUNT: 13.3 %
EST. GFR  (AFRICAN AMERICAN): >60 ML/MIN/1.73 M^2
EST. GFR  (NON AFRICAN AMERICAN): >60 ML/MIN/1.73 M^2
GLUCOSE SERPL-MCNC: 128 MG/DL
GLUCOSE UR QL STRIP: NEGATIVE
HCT VFR BLD AUTO: 41.1 %
HGB BLD-MCNC: 13.9 G/DL
HGB UR QL STRIP: NEGATIVE
KETONES UR QL STRIP: NEGATIVE
LEUKOCYTE ESTERASE UR QL STRIP: NEGATIVE
LYMPHOCYTES # BLD AUTO: 2 K/UL
LYMPHOCYTES NFR BLD: 23.9 %
MCH RBC QN AUTO: 28.7 PG
MCHC RBC AUTO-ENTMCNC: 33.8 G/DL
MCV RBC AUTO: 85 FL
MITRAL VALVE MOBILITY: NORMAL
MONOCYTES # BLD AUTO: 0.5 K/UL
MONOCYTES NFR BLD: 6.2 %
NEUTROPHILS # BLD AUTO: 5.8 K/UL
NEUTROPHILS NFR BLD: 68.3 %
NITRITE UR QL STRIP: NEGATIVE
PH UR STRIP: 8 [PH] (ref 5–8)
PLATELET # BLD AUTO: 276 K/UL
PMV BLD AUTO: 9.4 FL
POTASSIUM SERPL-SCNC: 3.8 MMOL/L
PROT SERPL-MCNC: 7 G/DL
PROT UR QL STRIP: NEGATIVE
RBC # BLD AUTO: 4.85 M/UL
RETIRED EF AND QEF - SEE NOTES: 60 (ref 55–65)
SODIUM SERPL-SCNC: 136 MMOL/L
SP GR UR STRIP: <=1.005 (ref 1–1.03)
TROPONIN I SERPL DL<=0.01 NG/ML-MCNC: <0.006 NG/ML
URN SPEC COLLECT METH UR: ABNORMAL
UROBILINOGEN UR STRIP-ACNC: NEGATIVE EU/DL
WBC # BLD AUTO: 8.52 K/UL

## 2018-11-09 PROCEDURE — 63600175 PHARM REV CODE 636 W HCPCS

## 2018-11-09 PROCEDURE — 25000003 PHARM REV CODE 250: Performed by: PHYSICIAN ASSISTANT

## 2018-11-09 PROCEDURE — 99152 MOD SED SAME PHYS/QHP 5/>YRS: CPT

## 2018-11-09 PROCEDURE — G0378 HOSPITAL OBSERVATION PER HR: HCPCS

## 2018-11-09 PROCEDURE — 81003 URINALYSIS AUTO W/O SCOPE: CPT

## 2018-11-09 PROCEDURE — 93458 L HRT ARTERY/VENTRICLE ANGIO: CPT | Mod: 26,,, | Performed by: INTERNAL MEDICINE

## 2018-11-09 PROCEDURE — 84484 ASSAY OF TROPONIN QUANT: CPT

## 2018-11-09 PROCEDURE — 99285 EMERGENCY DEPT VISIT HI MDM: CPT | Mod: 25,,, | Performed by: INTERNAL MEDICINE

## 2018-11-09 PROCEDURE — 80053 COMPREHEN METABOLIC PANEL: CPT

## 2018-11-09 PROCEDURE — 93306 TTE W/DOPPLER COMPLETE: CPT | Mod: 26,,, | Performed by: INTERNAL MEDICINE

## 2018-11-09 PROCEDURE — C1769 GUIDE WIRE: HCPCS

## 2018-11-09 PROCEDURE — 93306 TTE W/DOPPLER COMPLETE: CPT

## 2018-11-09 PROCEDURE — 85025 COMPLETE CBC W/AUTO DIFF WBC: CPT

## 2018-11-09 PROCEDURE — 99152 MOD SED SAME PHYS/QHP 5/>YRS: CPT | Mod: ,,, | Performed by: INTERNAL MEDICINE

## 2018-11-09 PROCEDURE — 82550 ASSAY OF CK (CPK): CPT

## 2018-11-09 PROCEDURE — 25000003 PHARM REV CODE 250

## 2018-11-09 PROCEDURE — 93458 L HRT ARTERY/VENTRICLE ANGIO: CPT

## 2018-11-09 PROCEDURE — 93010 ELECTROCARDIOGRAM REPORT: CPT | Mod: ,,, | Performed by: INTERNAL MEDICINE

## 2018-11-09 PROCEDURE — 99285 EMERGENCY DEPT VISIT HI MDM: CPT | Mod: 25

## 2018-11-09 PROCEDURE — 83880 ASSAY OF NATRIURETIC PEPTIDE: CPT

## 2018-11-09 RX ORDER — ONDANSETRON 2 MG/ML
4 INJECTION INTRAMUSCULAR; INTRAVENOUS EVERY 12 HOURS PRN
Status: DISCONTINUED | OUTPATIENT
Start: 2018-11-09 | End: 2018-11-09 | Stop reason: HOSPADM

## 2018-11-09 RX ORDER — NITROGLYCERIN 0.4 MG/1
0.4 TABLET SUBLINGUAL EVERY 5 MIN PRN
Status: DISCONTINUED | OUTPATIENT
Start: 2018-11-09 | End: 2018-11-09 | Stop reason: HOSPADM

## 2018-11-09 RX ORDER — SODIUM CHLORIDE 9 MG/ML
INJECTION, SOLUTION INTRAVENOUS CONTINUOUS
Status: DISCONTINUED | OUTPATIENT
Start: 2018-11-09 | End: 2018-11-09 | Stop reason: HOSPADM

## 2018-11-09 RX ORDER — SODIUM CHLORIDE 0.9 % (FLUSH) 0.9 %
3 SYRINGE (ML) INJECTION EVERY 8 HOURS
Status: DISCONTINUED | OUTPATIENT
Start: 2018-11-09 | End: 2018-11-09 | Stop reason: HOSPADM

## 2018-11-09 RX ORDER — ATORVASTATIN CALCIUM 40 MG/1
40 TABLET, FILM COATED ORAL NIGHTLY
Status: DISCONTINUED | OUTPATIENT
Start: 2018-11-09 | End: 2018-11-09 | Stop reason: HOSPADM

## 2018-11-09 RX ORDER — ACETAMINOPHEN 325 MG/1
650 TABLET ORAL EVERY 4 HOURS PRN
Status: DISCONTINUED | OUTPATIENT
Start: 2018-11-09 | End: 2018-11-09 | Stop reason: HOSPADM

## 2018-11-09 RX ORDER — ASPIRIN 325 MG
325 TABLET ORAL DAILY
Status: DISCONTINUED | OUTPATIENT
Start: 2018-11-09 | End: 2018-11-09 | Stop reason: HOSPADM

## 2018-11-09 RX ORDER — METOPROLOL TARTRATE 25 MG/1
25 TABLET, FILM COATED ORAL 2 TIMES DAILY
Status: DISCONTINUED | OUTPATIENT
Start: 2018-11-09 | End: 2018-11-09 | Stop reason: HOSPADM

## 2018-11-09 RX ORDER — MORPHINE SULFATE 4 MG/ML
2 INJECTION, SOLUTION INTRAMUSCULAR; INTRAVENOUS EVERY 4 HOURS PRN
Status: DISCONTINUED | OUTPATIENT
Start: 2018-11-09 | End: 2018-11-09 | Stop reason: HOSPADM

## 2018-11-09 RX ADMIN — SODIUM CHLORIDE: 0.9 INJECTION, SOLUTION INTRAVENOUS at 10:11

## 2018-11-09 RX ADMIN — SODIUM CHLORIDE: 9 INJECTION, SOLUTION INTRAVENOUS at 01:11

## 2018-11-09 NOTE — H&P
[facility] Hospital Medicine  History & Physical    Patient Name: Lv Joyce  MRN: [id number]  Admission Date: 11/9/2018  Attending Physician: Linden Lei MD   Primary Care Provider: Primary Doctor No         Patient information was obtained from patient, past medical records and ER records.     Subjective:     Principal Problem:Unstable angina    Chief Complaint:   Chief Complaint   Patient presents with    Chest Pain     had stress test yesterday        HPI: 47 y/o male with no significant PMHx that presented to the ED with c/o chest pain that onset gradually yesterday evening. The pain is described as pressure that is to Left chest that radiates down left arm. Pain is worse at rest, and somewhat relieved with activity. Patient had a positive Stress Test yesterday. Associated symptoms include intermittent dizziness and BLE weakness. Symptoms are intermittent and moderate. Exacerbated with rest and somewhat relieved with activity. Patient denies fever, chills, N/V/D, SOB, diaphoresis, dysuria, ankle edema, and all other symptoms at this time. Cardiology consulted and patient is scheduled for [facility] today.  He is a full code and his surrogate decision maker is his father, Jan.    History reviewed. No pertinent past medical history.    History reviewed. No pertinent surgical history.    Review of patient's allergies indicates:  No Known Allergies    No current facility-administered medications on file prior to encounter.      Current Outpatient Medications on File Prior to Encounter   Medication Sig    aspirin (ECOTRIN) 81 MG EC tablet Take 81 mg by mouth once daily.    nitroGLYCERIN (NITROSTAT) 0.4 MG SL tablet Place 1 tablet (0.4 mg total) under the tongue every 5 (five) minutes as needed for Chest pain.    pantoprazole (PROTONIX) 40 MG tablet Take 1 tablet (40 mg total) by mouth once daily.    atorvastatin (LIPITOR) 40 MG tablet Take 1 tablet (40 mg total) by mouth once  daily.    metoprolol tartrate (LOPRESSOR) 25 MG tablet Take 1 tablet (25 mg total) by mouth 2 (two) times daily.     Family History     Problem Relation (Age of Onset)    Hyperlipidemia Mother, Father    Hypertension Mother    No Known Problems Brother, Brother        Tobacco Use    Smoking status: Current Every Day Smoker     Packs/day: 1.00     Years: 30.00     Pack years: 30.00     Types: Cigarettes    Smokeless tobacco: Never Used   Substance and Sexual Activity    Alcohol use: No     Frequency: Never    Drug use: No    Sexual activity: Not on file     Review of Systems   Constitutional: Negative for activity change, appetite change, chills, diaphoresis and fatigue.   HENT: Negative for congestion, postnasal drip, rhinorrhea and sore throat.    Eyes: Negative for pain and visual disturbance.   Respiratory: Negative for cough, shortness of breath and wheezing.    Cardiovascular: Positive for chest pain. Negative for palpitations and leg swelling.   Gastrointestinal: Negative for abdominal distention, abdominal pain, constipation, diarrhea, nausea and vomiting.   Endocrine: Negative for cold intolerance and heat intolerance.   Genitourinary: Negative for difficulty urinating, dysuria and hematuria.   Musculoskeletal: Negative for arthralgias, back pain and myalgias.   Skin: Negative for color change and wound.   Allergic/Immunologic: Negative.    Neurological: Positive for dizziness. Negative for seizures, speech difficulty, weakness and headaches.   Hematological: Negative.    Psychiatric/Behavioral: Negative for agitation, behavioral problems and confusion. The patient is not nervous/anxious.      Objective:     Vital Signs (Most Recent):  Temp: 98.2 °F (36.8 °C) (11/09/18 0717)  Pulse: 73 (11/09/18 0717)  Resp: 18 (11/09/18 0717)  BP: 124/68 (11/09/18 0717)  SpO2: 97 % (11/09/18 0717) Vital Signs (24h Range):  Temp:  [98.2 °F (36.8 °C)] 98.2 °F (36.8 °C)  Pulse:  [73] 73  Resp:  [18] 18  SpO2:  [97 %] 97  %  BP: (124)/(68) 124/68     Weight: 91.7 kg (202 lb 2.6 oz)  Body mass index is 31.66 kg/m².    Physical Exam   Constitutional: He is oriented to person, place, and time. He appears well-developed and well-nourished. No distress.   HENT:   Head: Normocephalic and atraumatic.   Nose: Nose normal.   Mouth/Throat: Oropharynx is clear and moist.   Eyes: Conjunctivae and EOM are normal. Right eye exhibits no discharge.   Neck: Normal range of motion. Neck supple. No JVD present.   Cardiovascular: Normal rate, regular rhythm, normal heart sounds and intact distal pulses. Exam reveals no gallop and no friction rub.   No murmur heard.  Pulmonary/Chest: Effort normal and breath sounds normal. No respiratory distress.   Abdominal: Soft. Bowel sounds are normal. He exhibits no distension. There is no tenderness.   Genitourinary:   Genitourinary Comments: deferred   Musculoskeletal: Normal range of motion. He exhibits no edema.   Neurological: He is alert and oriented to person, place, and time. No cranial nerve deficit.   Skin: Skin is warm and dry. Capillary refill takes less than 2 seconds. He is not diaphoretic. No erythema.   Psychiatric: He has a normal mood and affect. His behavior is normal. Judgment and thought content normal.     Assistance with smoking cessation was offered, including:  [x]  Medications  [x]  Counseling  [x]  Printed Information on Smoking Cessation  []  Referral to a Smoking Cessation Program    Patient was counseled regarding smoking for 3-10 minutes.        CRANIAL NERVES     CN III, IV, VI   Extraocular motions are normal.        Significant Labs:   Recent Lab Results       11/09/18  0804   11/08/18  1230        Albumin 3.9       Alkaline Phosphatase 56       ALT 26       Anion Gap 10       AST 22       Baso # 0.03       Basophil% 0.4       Total Bilirubin 1.6  Comment:  For infants and newborns, interpretation of results should be based  on gestational age, weight and in agreement with  clinical  observations.  Premature Infant recommended reference ranges:  Up to 24 hours.............<8.0 mg/dL  Up to 48 hours............<12.0 mg/dL  3-5 days..................<15.0 mg/dL  6-29 days.................<15.0 mg/dL         BNP 12  Comment:  Values of less than 100 pg/ml are consistent with non-CHF populations.       BUN, Bld 9       Calcium 9.2       Chloride 105       CO2 21              Creatinine 0.8       Diastolic Dysfunction   No     Differential Method Automated       eGFR if  >60       eGFR if non  >60  Comment:  Calculation used to obtain the estimated glomerular filtration  rate (eGFR) is the CKD-EPI equation.          Eos # 0.1       Eosinophil% 1.2       Glucose 128       Gran # (ANC) 5.8       Gran% 68.3       Hematocrit 41.1       Hemoglobin 13.9       Lymph # 2.0       Lymph% 23.9       MCH 28.7       MCHC 33.8       MCV 85       Mono # 0.5       Mono% 6.2       MPV 9.4       Platelets 276       Potassium 3.8       Total Protein 7.0       RBC 4.85       RDW 13.3       Sodium 136       Troponin I <0.006  Comment:  The reference interval for Troponin I represents the 99th percentile   cutoff   for our facility and is consistent with 3rd generation assay   performance.         WBC 8.52           All pertinent labs within the past 24 hours have been reviewed.    Significant Imaging: I have reviewed all pertinent imaging results/findings within the past 24 hours.   Imaging Results          IR Heart Cath Images (In process)                X-Ray Chest PA And Lateral (Final result)  Result time 11/09/18 07:58:01    Final result by Juan Miguel Mcmahan MD (11/09/18 07:58:01)                 Impression:      No acute process seen.      Electronically signed by: Juan Miguel Mcmahan MD  Date:    11/09/2018  Time:    07:58             Narrative:    EXAMINATION:  XR CHEST PA AND LATERAL    CLINICAL HISTORY:  chest pain;    COMPARISON:  None    FINDINGS:  Cardiac silhouette  is normal.  The lungs demonstrate no evidence of active disease.  No evidence of pleural effusion or pneumothorax.  Bones appear intact.                                  Assessment/Plan:     * Unstable angina    --cardiology following  --+ stress test yesterday:  1. The EKG portion of this study is positive for ischemia at a moderate workload, and peak heart rate of 151 bpm (87% of predicted).   2. Exercise capacity is above average.   3. Blood pressure response to exercise was hypertensive (Presenting BP: 154/91 Peak BP: 180/80).   4. No significant arrhythmias were present.   5. There were no symptoms of chest discomfort or significant dyspnea throughout the protocol.   6. The Duke treadmill score was -1 suggesting an intermediate probability for future cardiovascular events.    --[facility] today  --2D echo pending  --trend troponins       Tobacco abuse    --pt reports 30 pk year hx  --reports he has been cutting down lately  --discussed cessation at length         Other hyperlipidemia    --Lipid panel 2 weeks ago: Total: 202, Trigs: 169, HDL: 37, LDL: 131  --MD recommended statin but pt wanted to try lifestyle modification 1st           VTE Risk Mitigation (From admission, onward)        Ordered     IP VTE HIGH RISK PATIENT  Once      11/09/18 0951     Place DONNELL hose  Until discontinued      11/09/18 0951             NATALIA Yu  Department of Hospital Medicine   [facility]

## 2018-11-09 NOTE — ED NOTES
The patient is resting in bed alert and awake. . Airway is open and patent, respirations are spontaneous, normal respiratory effort and rate noted, skin warm and dry, appearance: in no acute distress and resting comfortably.

## 2018-11-09 NOTE — SUBJECTIVE & OBJECTIVE
History reviewed. No pertinent past medical history.    History reviewed. No pertinent surgical history.    Review of patient's allergies indicates:  No Known Allergies    No current facility-administered medications on file prior to encounter.      Current Outpatient Medications on File Prior to Encounter   Medication Sig    aspirin (ECOTRIN) 81 MG EC tablet Take 81 mg by mouth once daily.    nitroGLYCERIN (NITROSTAT) 0.4 MG SL tablet Place 1 tablet (0.4 mg total) under the tongue every 5 (five) minutes as needed for Chest pain.    pantoprazole (PROTONIX) 40 MG tablet Take 1 tablet (40 mg total) by mouth once daily.    atorvastatin (LIPITOR) 40 MG tablet Take 1 tablet (40 mg total) by mouth once daily.    metoprolol tartrate (LOPRESSOR) 25 MG tablet Take 1 tablet (25 mg total) by mouth 2 (two) times daily.     Family History     Problem Relation (Age of Onset)    Hyperlipidemia Mother, Father    Hypertension Mother    No Known Problems Brother, Brother        Tobacco Use    Smoking status: Current Every Day Smoker     Packs/day: 1.00     Years: 30.00     Pack years: 30.00     Types: Cigarettes    Smokeless tobacco: Never Used   Substance and Sexual Activity    Alcohol use: No     Frequency: Never    Drug use: No    Sexual activity: Not on file     Review of Systems   Constitution: Positive for weakness.   HENT: Negative.    Eyes: Negative.    Cardiovascular: Positive for chest pain.   Respiratory: Negative.    Hematologic/Lymphatic: Negative.    Skin: Negative.    Musculoskeletal: Positive for joint pain (left arm ).   Gastrointestinal: Negative.    Neurological: Positive for headaches.   Psychiatric/Behavioral: Negative.    Allergic/Immunologic: Negative.      Objective:     Vital Signs (Most Recent):  Temp: 98.2 °F (36.8 °C) (11/09/18 0717)  Pulse: 73 (11/09/18 0717)  Resp: 18 (11/09/18 0717)  BP: 124/68 (11/09/18 0717)  SpO2: 97 % (11/09/18 0717) Vital Signs (24h Range):  Temp:  [98.2 °F (36.8 °C)]  98.2 °F (36.8 °C)  Pulse:  [73] 73  Resp:  [18] 18  SpO2:  [97 %] 97 %  BP: (124)/(68) 124/68     Weight: 91.7 kg (202 lb 2.6 oz)  Body mass index is 31.66 kg/m².    SpO2: 97 %  O2 Device (Oxygen Therapy): room air    No intake or output data in the 24 hours ending 11/09/18 1150    Lines/Drains/Airways     Peripheral Intravenous Line                 Peripheral IV - Single Lumen 11/09/18 0804 Left Antecubital less than 1 day                Physical Exam   Constitutional: He is oriented to person, place, and time. He appears well-developed and well-nourished. No distress.   HENT:   Head: Normocephalic and atraumatic.   Eyes: Pupils are equal, round, and reactive to light. Right eye exhibits no discharge. Left eye exhibits no discharge.   Neck: Neck supple. No JVD present. No thyromegaly present.   Cardiovascular: Normal rate, regular rhythm, S1 normal, S2 normal and normal heart sounds.   No murmur heard.  Pulmonary/Chest: Effort normal and breath sounds normal. No respiratory distress. He has no wheezes. He has no rales.   Abdominal: Soft. He exhibits no distension. There is no tenderness. There is no rebound.   Musculoskeletal: He exhibits no edema.   Neurological: He is alert and oriented to person, place, and time.   Skin: Skin is warm and dry. He is not diaphoretic. No erythema.   Psychiatric: He has a normal mood and affect. His behavior is normal. Thought content normal.   Nursing note and vitals reviewed.      Significant Labs:   CMP   Recent Labs   Lab 11/09/18  0804      K 3.8      CO2 21*   *   BUN 9   CREATININE 0.8   CALCIUM 9.2   PROT 7.0   ALBUMIN 3.9   BILITOT 1.6*   ALKPHOS 56   AST 22   ALT 26   ANIONGAP 10   ESTGFRAFRICA >60   EGFRNONAA >60   , CBC   Recent Labs   Lab 11/09/18  0804   WBC 8.52   HGB 13.9*   HCT 41.1      , Lipid Panel No results for input(s): CHOL, HDL, LDLCALC, TRIG, CHOLHDL in the last 48 hours., Troponin   Recent Labs   Lab 11/09/18  0804   TROPONINI  <0.006    and All pertinent lab results from the last 24 hours have been reviewed.    Significant Imaging: Echocardiogram: 2D echo with color flow doppler: No results found for this or any previous visit., EKG: Reviewed and X-Ray: CXR: X-Ray Chest 1 View (CXR): No results found for this visit on 11/09/18. and X-Ray Chest PA and Lateral (CXR):   Results for orders placed or performed during the hospital encounter of 11/09/18   X-Ray Chest PA And Lateral    Narrative    EXAMINATION:  XR CHEST PA AND LATERAL    CLINICAL HISTORY:  chest pain;    COMPARISON:  None    FINDINGS:  Cardiac silhouette is normal.  The lungs demonstrate no evidence of active disease.  No evidence of pleural effusion or pneumothorax.  Bones appear intact.      Impression    No acute process seen.      Electronically signed by: Juan Miguel Mcmahan MD  Date:    11/09/2018  Time:    07:58

## 2018-11-09 NOTE — ED NOTES
Ptc/o chest pain. He took two NTG tablets this morning for chest pain before arriving to the ER. He states he had chest pain on yesterday, and also took two NTG tablets, but the pain subsided.       Patient moved to ED room 1, patient assisted onto stretcher and changed into a gown. Patient placed on cardiac monitor, continuous pulse oximetry and automatic blood pressure cuff. Bed placed in low locked position, side rails up x 2, call light is within reach of patient or family, orientation to room and explanation of wait provided to family and patient, alarms set and turned on for monitor and pulse ox, awaiting MD evaluation and orders, will continue to monitor.    Patient identifies self as Lv Joyce      LOC: The patient is awake, alert and aware of environment with an appropriate affect, the patient is oriented x 3 and speaking appropriately.  APPEARANCE: Patient resting comfortably and in no acute distress, patient is clean and well groomed, patient's clothing is properly fastened.  SKIN: The skin is warm and dry, color consistent with ethnicity, patient has normal skin turgor and moist mucus membranes, skin intact, no breakdown or bruising noted.  MUSCULOSKELETAL: Patient moving all extremities well, no obvious swelling or deformities noted.  RESPIRATORY: Airway is open and patent, respirations are spontaneous, patient has a normal effort and rate, no accessory muscle use noted.  CARDIAC: Patient has a normal rate and rhythm, no periphreal edema noted, capillary refill < 3 seconds. Pt c/o of chest pain 2/10 on left side.   ABDOMEN: Soft and non tender to palpation, no distention noted.  NEUROLOGIC: PERRL, 3mm bilaterally, eyes open spontaneously, behavior appropriate to situation, follows commands, facial expression symmetrical, bilateral hand grasp equal and even, purposeful motor response noted, normal sensation in all extremities when touched with a finger.

## 2018-11-09 NOTE — OP NOTE
[facility] -   Cardiac Catheterization  Procedure Note    SUMMARY     Lv Joyce  [id number]  Primary Doctor No    Date of Procedure: 11/9/2018    Procedure:  1.  LHC 2. LEFT VENTRICULOGRAM  3. CORONARY ANGIOGRAM    Provider: Waylon Galvan MD    Assisting Provider:  Tim Umana    Indications: He was referred for cardiac catheterization to further evaluate possible unstable angina.    Pre-Procedure Diagnosis:  Unstable angina    Post-Procedure Diagnosis:  Minimal CAD    Anesthesia: RN IV Sedation    Description of the Findings of the Procedure:     The risks, benefits, complications, treatment options, and expected outcomes were discussed with the patient. The patient and/or family concurred with the proposed plan, giving informed consent. Patient was brought to the cath lab after IV hydration was begun and oral premedication was given.    Findings:  10% mid LAD stenosis, angiographically normal elsewhere  Normal LVEF  Right radial TR band  See report    Complications: None; patient tolerated the procedure well.    Estimated Blood Loss (EBL): Minimal           Implants: none    Specimens: none    Condition: stable    Disposition: PACU - hemodynamically stable.    Attestation: I was present and scrubbed for the entire procedure.     Recommendations:    Usual post cath care  Medical treatment   Cardiac diet  Risk factor modification

## 2018-11-09 NOTE — ASSESSMENT & PLAN NOTE
--Lipid panel 2 weeks ago: Total: 202, Trigs: 169, HDL: 37, LDL: 131  --MD recommended statin but pt wanted to try lifestyle modification 1st

## 2018-11-09 NOTE — ASSESSMENT & PLAN NOTE
-Presents with recurrent episodes of left-sided/substernal chest pain  -Stress test yesterday + for ischemia with 1-2 mm of ST depression in inferolateral leads  -Atypical symptoms however given the above, concerning for UA  -Continue ASA, statin, BB  -Start IV fluids  -NPO. LHC today by Dr. Galvan for further evaluation and possible PCI if indicated. All risks, benefits, and treatment alternatives explained to patient in detail. All questions answered. He has agreed to proceed.

## 2018-11-09 NOTE — HPI
"Mr. Joyce is a 48 year old male patient with a PMHx of tobacco abuse, GERD, and hyperlipidemia who presented to [facility] ED this AM with a chief complaint of substernal and left-sided chest pressure that began yesterday after completing a treadmill stress test. Associated symptoms included weakness and mild headache. Patient denied any associated SOB, nausea, vomiting, palpitations, near syncope, or syncope. He did report radiation down his left arm. He tried taking sublingual nitro yesterday evening and again this morning for relief but decided to seek further treatment when symptoms persisted. Initial workup negative and cardiology consulted to assist with management. Patient seen and examined today while in ED. He is well known to cardiology service and followed by Dr. Ayoub in clinic. Reports 2-3 month history of "funny/fuzzy" discomfort in his chest. States typically happens at rest, non-exertional. Denies any prior cardiac history. Compliant with meds. He underwent cardiac treadmill stress test yesterday which was positive for ischemia with 1-2 mm of ST depression in inferolateral leads. Chart reviewed. Troponin negative. EKG without acute ischemic changes. 2D echo pending.  "

## 2018-11-09 NOTE — CONSULTS
"[facility]  Cardiology  Consult Note    Patient Name: Lv Joyce  MRN: [id number]  Admission Date: 11/9/2018  Hospital Length of Stay: 0 days  Code Status: Full Code   Attending Provider: Linden Lei MD   Consulting Provider: Gayle Meyer PA-C  Primary Care Physician: Primary Doctor No  Principal Problem:Unstable angina    Patient information was obtained from patient, past medical records and ER records.     Inpatient consult to Cardiology  Consult performed by: Gayle Meyer PA-C  Consult ordered by: Linden Lei MD        Subjective:     Chief Complaint: Chest pain/unstable angina     HPI:   Mr. Joyce is a 48 year old male patient with a PMHx of tobacco abuse, GERD, and hyperlipidemia who presented to [facility] ED this AM with a chief complaint of substernal and left-sided chest pressure that began yesterday after completing a treadmill stress test. Associated symptoms included weakness and mild headache. Patient denied any associated SOB, nausea, vomiting, palpitations, near syncope, or syncope. He did report radiation down his left arm. He tried taking sublingual nitro yesterday evening and again this morning for relief but decided to seek further treatment when symptoms persisted. Initial workup negative and cardiology consulted to assist with management. Patient seen and examined today while in ED. He is well known to cardiology service and followed by Dr. Ayoub in clinic. Reports 2-3 month history of "funny/fuzzy" discomfort in his chest. States typically happens at rest, non-exertional. Denies any prior cardiac history. Compliant with meds. He underwent cardiac treadmill stress test yesterday which was positive for ischemia with 1-2 mm of ST depression in inferolateral leads. Chart reviewed. Troponin negative. EKG without acute ischemic changes. 2D echo pending.    History reviewed. No pertinent past medical history.    History reviewed. No pertinent " surgical history.    Review of patient's allergies indicates:  No Known Allergies    No current facility-administered medications on file prior to encounter.      Current Outpatient Medications on File Prior to Encounter   Medication Sig    aspirin (ECOTRIN) 81 MG EC tablet Take 81 mg by mouth once daily.    nitroGLYCERIN (NITROSTAT) 0.4 MG SL tablet Place 1 tablet (0.4 mg total) under the tongue every 5 (five) minutes as needed for Chest pain.    pantoprazole (PROTONIX) 40 MG tablet Take 1 tablet (40 mg total) by mouth once daily.    atorvastatin (LIPITOR) 40 MG tablet Take 1 tablet (40 mg total) by mouth once daily.    metoprolol tartrate (LOPRESSOR) 25 MG tablet Take 1 tablet (25 mg total) by mouth 2 (two) times daily.     Family History     Problem Relation (Age of Onset)    Hyperlipidemia Mother, Father    Hypertension Mother    No Known Problems Brother, Brother        Tobacco Use    Smoking status: Current Every Day Smoker     Packs/day: 1.00     Years: 30.00     Pack years: 30.00     Types: Cigarettes    Smokeless tobacco: Never Used   Substance and Sexual Activity    Alcohol use: No     Frequency: Never    Drug use: No    Sexual activity: Not on file     Review of Systems   Constitution: Positive for weakness.   HENT: Negative.    Eyes: Negative.    Cardiovascular: Positive for chest pain.   Respiratory: Negative.    Hematologic/Lymphatic: Negative.    Skin: Negative.    Musculoskeletal: Positive for joint pain (left arm ).   Gastrointestinal: Negative.    Neurological: Positive for headaches.   Psychiatric/Behavioral: Negative.    Allergic/Immunologic: Negative.      Objective:     Vital Signs (Most Recent):  Temp: 98.2 °F (36.8 °C) (11/09/18 0717)  Pulse: 73 (11/09/18 0717)  Resp: 18 (11/09/18 0717)  BP: 124/68 (11/09/18 0717)  SpO2: 97 % (11/09/18 0717) Vital Signs (24h Range):  Temp:  [98.2 °F (36.8 °C)] 98.2 °F (36.8 °C)  Pulse:  [73] 73  Resp:  [18] 18  SpO2:  [97 %] 97 %  BP: (124)/(68)  124/68     Weight: 91.7 kg (202 lb 2.6 oz)  Body mass index is 31.66 kg/m².    SpO2: 97 %  O2 Device (Oxygen Therapy): room air    No intake or output data in the 24 hours ending 11/09/18 1150    Lines/Drains/Airways     Peripheral Intravenous Line                 Peripheral IV - Single Lumen 11/09/18 0804 Left Antecubital less than 1 day                Physical Exam   Constitutional: He is oriented to person, place, and time. He appears well-developed and well-nourished. No distress.   HENT:   Head: Normocephalic and atraumatic.   Eyes: Pupils are equal, round, and reactive to light. Right eye exhibits no discharge. Left eye exhibits no discharge.   Neck: Neck supple. No JVD present. No thyromegaly present.   Cardiovascular: Normal rate, regular rhythm, S1 normal, S2 normal and normal heart sounds.   No murmur heard.  Pulmonary/Chest: Effort normal and breath sounds normal. No respiratory distress. He has no wheezes. He has no rales.   Abdominal: Soft. He exhibits no distension. There is no tenderness. There is no rebound.   Musculoskeletal: He exhibits no edema.   Neurological: He is alert and oriented to person, place, and time.   Skin: Skin is warm and dry. He is not diaphoretic. No erythema.   Psychiatric: He has a normal mood and affect. His behavior is normal. Thought content normal.   Nursing note and vitals reviewed.      Significant Labs:   CMP   Recent Labs   Lab 11/09/18  0804      K 3.8      CO2 21*   *   BUN 9   CREATININE 0.8   CALCIUM 9.2   PROT 7.0   ALBUMIN 3.9   BILITOT 1.6*   ALKPHOS 56   AST 22   ALT 26   ANIONGAP 10   ESTGFRAFRICA >60   EGFRNONAA >60   , CBC   Recent Labs   Lab 11/09/18  0804   WBC 8.52   HGB 13.9*   HCT 41.1      , Lipid Panel No results for input(s): CHOL, HDL, LDLCALC, TRIG, CHOLHDL in the last 48 hours., Troponin   Recent Labs   Lab 11/09/18  0804   TROPONINI <0.006    and All pertinent lab results from the last 24 hours have been  reviewed.    Significant Imaging: Echocardiogram: 2D echo with color flow doppler: No results found for this or any previous visit., EKG: Reviewed and X-Ray: CXR: X-Ray Chest 1 View (CXR): No results found for this visit on 11/09/18. and X-Ray Chest PA and Lateral (CXR):   Results for orders placed or performed during the hospital encounter of 11/09/18   X-Ray Chest PA And Lateral    Narrative    EXAMINATION:  XR CHEST PA AND LATERAL    CLINICAL HISTORY:  chest pain;    COMPARISON:  None    FINDINGS:  Cardiac silhouette is normal.  The lungs demonstrate no evidence of active disease.  No evidence of pleural effusion or pneumothorax.  Bones appear intact.      Impression    No acute process seen.      Electronically signed by: Juan Miguel Mcmahan MD  Date:    11/09/2018  Time:    07:58     Assessment and Plan:   Patient who presents with recurrent chest pain and positive treadmill stress test. Symptoms concerning for UA. Continue same meds. [facility] today for further evaluation. 2D echo pending.    * Unstable angina    -Presents with recurrent episodes of left-sided/substernal chest pain  -Stress test yesterday + for ischemia with 1-2 mm of ST depression in inferolateral leads  -Atypical symptoms however given the above, concerning for UA  -Continue ASA, statin, BB  -Start IV fluids  -NPO. C today by Dr. Galvan for further evaluation and possible PCI if indicated. All risks, benefits, and treatment alternatives explained to patient in detail. All questions answered. He has agreed to proceed.     Tobacco abuse    -Smoking cessation advised     Other hyperlipidemia    -Continue statin         VTE Risk Mitigation (From admission, onward)        Ordered     IP VTE HIGH RISK PATIENT  Once      11/09/18 0951     Place DONNELL hose  Until discontinued      11/09/18 0951          Thank you for your consult. I will follow-up with patient. Please contact us if you have any additional questions.    Gayle Meyer PA-C  Cardiology    [facility] -     Chart reviewed. Dr. Ayoub examined patient and agrees with plan as outlined above.

## 2018-11-09 NOTE — SUBJECTIVE & OBJECTIVE
History reviewed. No pertinent past medical history.    History reviewed. No pertinent surgical history.    Review of patient's allergies indicates:  No Known Allergies    No current facility-administered medications on file prior to encounter.      Current Outpatient Medications on File Prior to Encounter   Medication Sig    aspirin (ECOTRIN) 81 MG EC tablet Take 81 mg by mouth once daily.    nitroGLYCERIN (NITROSTAT) 0.4 MG SL tablet Place 1 tablet (0.4 mg total) under the tongue every 5 (five) minutes as needed for Chest pain.    pantoprazole (PROTONIX) 40 MG tablet Take 1 tablet (40 mg total) by mouth once daily.    atorvastatin (LIPITOR) 40 MG tablet Take 1 tablet (40 mg total) by mouth once daily.    metoprolol tartrate (LOPRESSOR) 25 MG tablet Take 1 tablet (25 mg total) by mouth 2 (two) times daily.     Family History     Problem Relation (Age of Onset)    Hyperlipidemia Mother, Father    Hypertension Mother    No Known Problems Brother, Brother        Tobacco Use    Smoking status: Current Every Day Smoker     Packs/day: 1.00     Years: 30.00     Pack years: 30.00     Types: Cigarettes    Smokeless tobacco: Never Used   Substance and Sexual Activity    Alcohol use: No     Frequency: Never    Drug use: No    Sexual activity: Not on file     Review of Systems   Constitutional: Negative for activity change, appetite change, chills, diaphoresis and fatigue.   HENT: Negative for congestion, postnasal drip, rhinorrhea and sore throat.    Eyes: Negative for pain and visual disturbance.   Respiratory: Negative for cough, shortness of breath and wheezing.    Cardiovascular: Positive for chest pain. Negative for palpitations and leg swelling.   Gastrointestinal: Negative for abdominal distention, abdominal pain, constipation, diarrhea, nausea and vomiting.   Endocrine: Negative for cold intolerance and heat intolerance.   Genitourinary: Negative for difficulty urinating, dysuria and hematuria.    Musculoskeletal: Negative for arthralgias, back pain and myalgias.   Skin: Negative for color change and wound.   Allergic/Immunologic: Negative.    Neurological: Positive for dizziness. Negative for seizures, speech difficulty, weakness and headaches.   Hematological: Negative.    Psychiatric/Behavioral: Negative for agitation, behavioral problems and confusion. The patient is not nervous/anxious.      Objective:     Vital Signs (Most Recent):  Temp: 98.2 °F (36.8 °C) (11/09/18 0717)  Pulse: 73 (11/09/18 0717)  Resp: 18 (11/09/18 0717)  BP: 124/68 (11/09/18 0717)  SpO2: 97 % (11/09/18 0717) Vital Signs (24h Range):  Temp:  [98.2 °F (36.8 °C)] 98.2 °F (36.8 °C)  Pulse:  [73] 73  Resp:  [18] 18  SpO2:  [97 %] 97 %  BP: (124)/(68) 124/68     Weight: 91.7 kg (202 lb 2.6 oz)  Body mass index is 31.66 kg/m².    Physical Exam   Constitutional: He is oriented to person, place, and time. He appears well-developed and well-nourished. No distress.   HENT:   Head: Normocephalic and atraumatic.   Nose: Nose normal.   Mouth/Throat: Oropharynx is clear and moist.   Eyes: Conjunctivae and EOM are normal. Right eye exhibits no discharge.   Neck: Normal range of motion. Neck supple. No JVD present.   Cardiovascular: Normal rate, regular rhythm, normal heart sounds and intact distal pulses. Exam reveals no gallop and no friction rub.   No murmur heard.  Pulmonary/Chest: Effort normal and breath sounds normal. No respiratory distress.   Abdominal: Soft. Bowel sounds are normal. He exhibits no distension. There is no tenderness.   Genitourinary:   Genitourinary Comments: deferred   Musculoskeletal: Normal range of motion. He exhibits no edema.   Neurological: He is alert and oriented to person, place, and time. No cranial nerve deficit.   Skin: Skin is warm and dry. Capillary refill takes less than 2 seconds. He is not diaphoretic. No erythema.   Psychiatric: He has a normal mood and affect. His behavior is normal. Judgment and  thought content normal.     Assistance with smoking cessation was offered, including:  [x]  Medications  [x]  Counseling  [x]  Printed Information on Smoking Cessation  []  Referral to a Smoking Cessation Program    Patient was counseled regarding smoking for 3-10 minutes.        CRANIAL NERVES     CN III, IV, VI   Extraocular motions are normal.        Significant Labs:   Recent Lab Results       11/09/18  0804   11/08/18  1230        Albumin 3.9       Alkaline Phosphatase 56       ALT 26       Anion Gap 10       AST 22       Baso # 0.03       Basophil% 0.4       Total Bilirubin 1.6  Comment:  For infants and newborns, interpretation of results should be based  on gestational age, weight and in agreement with clinical  observations.  Premature Infant recommended reference ranges:  Up to 24 hours.............<8.0 mg/dL  Up to 48 hours............<12.0 mg/dL  3-5 days..................<15.0 mg/dL  6-29 days.................<15.0 mg/dL         BNP 12  Comment:  Values of less than 100 pg/ml are consistent with non-CHF populations.       BUN, Bld 9       Calcium 9.2       Chloride 105       CO2 21              Creatinine 0.8       Diastolic Dysfunction   No     Differential Method Automated       eGFR if  >60       eGFR if non  >60  Comment:  Calculation used to obtain the estimated glomerular filtration  rate (eGFR) is the CKD-EPI equation.          Eos # 0.1       Eosinophil% 1.2       Glucose 128       Gran # (ANC) 5.8       Gran% 68.3       Hematocrit 41.1       Hemoglobin 13.9       Lymph # 2.0       Lymph% 23.9       MCH 28.7       MCHC 33.8       MCV 85       Mono # 0.5       Mono% 6.2       MPV 9.4       Platelets 276       Potassium 3.8       Total Protein 7.0       RBC 4.85       RDW 13.3       Sodium 136       Troponin I <0.006  Comment:  The reference interval for Troponin I represents the 99th percentile   cutoff   for our facility and is consistent with 3rd  generation assay   performance.         WBC 8.52           All pertinent labs within the past 24 hours have been reviewed.    Significant Imaging: I have reviewed all pertinent imaging results/findings within the past 24 hours.   Imaging Results          IR Heart Cath Images (In process)                X-Ray Chest PA And Lateral (Final result)  Result time 11/09/18 07:58:01    Final result by Juan Miguel Mcmahan MD (11/09/18 07:58:01)                 Impression:      No acute process seen.      Electronically signed by: Juan Miguel Mcmahan MD  Date:    11/09/2018  Time:    07:58             Narrative:    EXAMINATION:  XR CHEST PA AND LATERAL    CLINICAL HISTORY:  chest pain;    COMPARISON:  None    FINDINGS:  Cardiac silhouette is normal.  The lungs demonstrate no evidence of active disease.  No evidence of pleural effusion or pneumothorax.  Bones appear intact.

## 2018-11-09 NOTE — ASSESSMENT & PLAN NOTE
--pt reports 30 pk year hx  --reports he has been cutting down lately  --discussed cessation at length

## 2018-11-09 NOTE — ASSESSMENT & PLAN NOTE
--cardiology following  --+ stress test yesterday:  1. The EKG portion of this study is positive for ischemia at a moderate workload, and peak heart rate of 151 bpm (87% of predicted).   2. Exercise capacity is above average.   3. Blood pressure response to exercise was hypertensive (Presenting BP: 154/91 Peak BP: 180/80).   4. No significant arrhythmias were present.   5. There were no symptoms of chest discomfort or significant dyspnea throughout the protocol.   6. The Duke treadmill score was -1 suggesting an intermediate probability for future cardiovascular events.    --[facility] today  --2D echo pending  --trend troponins

## 2018-11-09 NOTE — INTERVAL H&P NOTE
The patient has been examined and the H&P has been reviewed:    I concur with the findings and changes have been noted since the H&P was written: pt presented to ER with c/o chest pain and primary cardiologist, Dr. Ayoub, requests cardiac catheterization.    Anesthesia/Surgery risks, benefits and alternative options discussed and understood by patient/family.          Active Hospital Problems    Diagnosis  POA    Unstable angina [I20.0]  Yes      Resolved Hospital Problems   No resolved problems to display.

## 2018-11-09 NOTE — ED PROVIDER NOTES
"SCRIBE #1 NOTE: I, Mojgan Sanchez, am scribing for, and in the presence of, Linden Lei MD. I have scribed the entire note.       History     Chief Complaint   Patient presents with    Chest Pain     had stress test yesterday     Review of patient's allergies indicates:  No Known Allergies      History of Present Illness     HPI    11/9/2018, 7:15 AM  History obtained from the patient      History of Present Illness: Lv Joyce is a 48 y.o. male patient who presents to the Emergency Department for evaluation of non-radiating chest dullness which he has been experiencing intermittently for 3 months but has been constant since 5 AM this morning. He reports that his PCP, Dr. Bradford, referred him to Dr. Ayoub (Cardiology) who performed an ECHO. He states that he had a stress test completed on yesterday, which was abnormal, and developed CP afterwards on his way to [place]. Patient states that it is not pain or tight but "feels funny." Sxs are moderate in severity. He was prescribed NTG by Cardiology which improved the pain yesterday but not today. No exacerbating factors reported. He denies any fever, chills, diaphoresis, SOB, cough, leg pain, N/V, dizziness, extremity weakness/numbness, and all other sxs at this time. Patient was also started on Lipitor, Metoprolol, and Protonix by Dr. Ayoub.    Arrival mode: Personal vehicle    PCP: Primary Doctor No      Past Medical History:  Past medical history reviewed not relevant      Past Surgical History:  Past surgical history reviewed not relevant      Family History:  Family history reviewed not relevant    Social History:  Social History     Tobacco Use    Smoking status: Current Every Day Smoker     Packs/day: 0.25    Smokeless tobacco: Never Used   Substance and Sexual Activity    Alcohol use: No     Frequency: Never    Drug use: No    Sexual activity: Unknown        Review of Systems     Review of Systems   Constitutional: Negative for chills, " "diaphoresis and fever.   HENT: Negative for sore throat.    Respiratory: Negative for cough and shortness of breath.    Cardiovascular: Positive for chest pain (dullness). Negative for palpitations and leg swelling.   Gastrointestinal: Negative for abdominal pain, nausea and vomiting.   Genitourinary: Negative for dysuria.   Musculoskeletal: Negative for back pain.   Skin: Negative for rash.   Neurological: Negative for dizziness, weakness, light-headedness, numbness and headaches.   Hematological: Does not bruise/bleed easily.   All other systems reviewed and are negative.     Physical Exam     Initial Vitals [11/09/18 0717]   BP Pulse Resp Temp SpO2   124/68 73 18 98.2 °F (36.8 °C) 97 %      MAP       --          Physical Exam  Nursing Notes and Vital Signs Reviewed.  Constitutional: Patient is in no acute distress. Well-developed and well-nourished.  Head: Atraumatic. Normocephalic.  Eyes: PERRL. EOM intact. Conjunctivae are not pale. No scleral icterus.  ENT: Mucous membranes are moist. Oropharynx is clear and symmetric.    Neck: Supple. Full ROM. No lymphadenopathy.  Cardiovascular: Regular rate. Regular rhythm. No murmurs, rubs, or gallops. Distal pulses are 2+ and symmetric.  Pulmonary/Chest: No respiratory distress. Clear to auscultation bilaterall. No wheezing or rales.  Abdominal: Soft and non-distended.  There is no tenderness.  No rebound, guarding, or rigidity. Good bowel sounds.  Musculoskeletal: Moves all extremities. No obvious deformities. No edema. No calf tenderness.  Skin: Warm and dry.  Neurological:  Alert, awake, and appropriate.  Normal speech.  No acute focal neurological deficits are appreciated.  Psychiatric: Normal affect. Good eye contact. Appropriate in content.     ED Course   Procedures  ED Vital Signs:  Vitals:    11/09/18 0717   BP: 124/68   Pulse: 73   Resp: 18   Temp: 98.2 °F (36.8 °C)   TempSrc: Oral   SpO2: 97%   Weight: 91.7 kg (202 lb 2.6 oz)   Height: 5' 7" (1.702 m) "       Abnormal Lab Results:  Labs Reviewed   CBC W/ AUTO DIFFERENTIAL - Abnormal; Notable for the following components:       Result Value    Hemoglobin 13.9 (*)     All other components within normal limits   COMPREHENSIVE METABOLIC PANEL - Abnormal; Notable for the following components:    CO2 21 (*)     Glucose 128 (*)     Total Bilirubin 1.6 (*)     All other components within normal limits   CK   TROPONIN I   URINALYSIS, REFLEX TO URINE CULTURE   B-TYPE NATRIURETIC PEPTIDE        All Lab Results:  Results for orders placed or performed during the hospital encounter of 11/09/18   CBC auto differential   Result Value Ref Range    WBC 8.52 3.90 - 12.70 K/uL    RBC 4.85 4.60 - 6.20 M/uL    Hemoglobin 13.9 (L) 14.0 - 18.0 g/dL    Hematocrit 41.1 40.0 - 54.0 %    MCV 85 82 - 98 fL    MCH 28.7 27.0 - 31.0 pg    MCHC 33.8 32.0 - 36.0 g/dL    RDW 13.3 11.5 - 14.5 %    Platelets 276 150 - 350 K/uL    MPV 9.4 9.2 - 12.9 fL    Gran # (ANC) 5.8 1.8 - 7.7 K/uL    Lymph # 2.0 1.0 - 4.8 K/uL    Mono # 0.5 0.3 - 1.0 K/uL    Eos # 0.1 0.0 - 0.5 K/uL    Baso # 0.03 0.00 - 0.20 K/uL    Gran% 68.3 38.0 - 73.0 %    Lymph% 23.9 18.0 - 48.0 %    Mono% 6.2 4.0 - 15.0 %    Eosinophil% 1.2 0.0 - 8.0 %    Basophil% 0.4 0.0 - 1.9 %    Differential Method Automated    Comprehensive metabolic panel   Result Value Ref Range    Sodium 136 136 - 145 mmol/L    Potassium 3.8 3.5 - 5.1 mmol/L    Chloride 105 95 - 110 mmol/L    CO2 21 (L) 23 - 29 mmol/L    Glucose 128 (H) 70 - 110 mg/dL    BUN, Bld 9 6 - 20 mg/dL    Creatinine 0.8 0.5 - 1.4 mg/dL    Calcium 9.2 8.7 - 10.5 mg/dL    Total Protein 7.0 6.0 - 8.4 g/dL    Albumin 3.9 3.5 - 5.2 g/dL    Total Bilirubin 1.6 (H) 0.1 - 1.0 mg/dL    Alkaline Phosphatase 56 55 - 135 U/L    AST 22 10 - 40 U/L    ALT 26 10 - 44 U/L    Anion Gap 10 8 - 16 mmol/L    eGFR if African American >60 >60 mL/min/1.73 m^2    eGFR if non African American >60 >60 mL/min/1.73 m^2   CK   Result Value Ref Range     20 -  200 U/L   Troponin I   Result Value Ref Range    Troponin I <0.006 0.000 - 0.026 ng/mL     Imaging Results:  Imaging Results          X-Ray Chest PA And Lateral (Final result)  Result time 11/09/18 07:58:01    Final result by Juan Miguel Mcmahan MD (11/09/18 07:58:01)                 Impression:      No acute process seen.      Electronically signed by: Juan Miguel Mcmahan MD  Date:    11/09/2018  Time:    07:58             Narrative:    EXAMINATION:  XR CHEST PA AND LATERAL    CLINICAL HISTORY:  chest pain;    COMPARISON:  None    FINDINGS:  Cardiac silhouette is normal.  The lungs demonstrate no evidence of active disease.  No evidence of pleural effusion or pneumothorax.  Bones appear intact.                                 The EKG was ordered, reviewed, and independently interpreted by the ED provider.  Interpretation time: 7:5  Rate: 71 BPM  Rhythm: normal sinus rhythm  Interpretation: No acute ST changes. No STEMI.         The Emergency Provider reviewed the vital signs and test results, which are outlined above.     ED Discussion     8:49 AM: Discussed pt's case with Dr. Ayoub (Cardiology) who states to continue betablocker and statin which he started patient on yesterday. He will see patient in the ED and recommends admitting patient to hospital medicine. He talked to Dr. Galvan (Cardiology) and will perform a heart cath on pt later today. He states to have patient NPO.    8:56 AM: Re-evaluated pt. I have discussed test results, shared treatment plan, and the need for admission with patient and family at bedside. Pt and family express understanding at this time and agree with all information. All questions answered. Pt and family have no further questions or concerns at this time. Pt is ready for admit.    8:59 AM: Discussed case with MARY Merlos (Hospital [facility]) via Secure Chat. Dr. Alas agrees with current care and management of pt and accepts admission.   Admitting Service: Hospital Medicine   Admitting  Physician: Dr. Alas  Admit to: Obs-Tele    ED Medication(s):  Medications   GI cocktail (mylanta 30 mL, lidocaine 2 % viscous 10 mL, dicyclomine 10 mL) 50 mL (0 mLs Oral Hold 11/9/18 0730)        Medical Decision Making     Medical Decision Making:   Clinical Tests:   Lab Tests: Ordered and Reviewed  Radiological Study: Ordered and Reviewed  Medical Tests: Ordered and Reviewed             Scribe Attestation:   Scribe #1: I performed the above scribed service and the documentation accurately describes the services I performed. I attest to the accuracy of the note.     Attending:   Physician Attestation Statement for Scribe #1: I, Linden Lei MD, personally performed the services described in this documentation, as scribed by Mojgan Sanchez, in my presence, and it is both accurate and complete.           Clinical Impression       ICD-10-CM ICD-9-CM   1. Unstable angina I20.0 411.1   2. Chest pain R07.9 786.50   3. Abnormal stress test R94.39 794.39       Disposition:   Disposition: Placed in Observation (obs-tele)  Condition: Fair         Linden Lei MD  11/09/18 3325

## 2018-11-09 NOTE — PLAN OF CARE
Discharge instructions rev'd with pt and spouse. BVU. IV removed. R radial site WDL, dressing CDI, wrist immobilizer secure.

## 2018-11-09 NOTE — HOSPITAL COURSE
Patient was seen by cardiology who recommended a C today. He went to cath lab and has 10% LAD lesion. Cardiology recommends medical management, which will be daily ASA, daily BB, daily statin, OP sleep study and f/u with cardiology. Discussed with cardiology via secure chat, Pt okay to dc home today. Patient will follow a cardiac diet, stop smoking, and begin daily exercise of walking for 30 minutes/day. Patient was seen and examined today and deemed stable for discharge home.

## 2018-11-09 NOTE — DISCHARGE SUMMARY
[facility] Hospital Medicine  Discharge Summary      Patient Name: Lv Joyce  MRN: [id number]  Admission Date: 11/9/2018  Hospital Length of Stay: 0 days  Discharge Date and Time:  11/09/2018 3:01 PM  Attending Physician: No att. providers found   Discharging Provider: NATALIA Yu  Primary Care Provider: Yo Bradford MD      HPI:   47 y/o male with no significant PMHx that presented to the ED with c/o chest pain that onset gradually yesterday evening. The pain is described as pressure that is to Left chest that radiates down left arm. Pain is worse at rest, and somewhat relieved with activity. Patient had a positive Stress Test yesterday. Associated symptoms include intermittent dizziness and BLE weakness. Symptoms are intermittent and moderate. Exacerbated with rest and somewhat relieved with activity. Patient denies fever, chills, N/V/D, SOB, diaphoresis, dysuria, ankle edema, and all other symptoms at this time. Cardiology consulted and patient is scheduled for [facility] today.  He is a full code and his surrogate decision maker is his father, Jan.    Procedure(s) (LRB):  CATHETERIZATION, HEART, LEFT (Left)      Hospital Course:   Patient was seen by cardiology who recommended a LHC today. He went to cath lab and has 10% LAD lesion. Cardiology recommends medical management, which will be daily ASA, daily BB, daily statin, OP sleep study and f/u with cardiology. Discussed with cardiology via secure chat, Pt okay to dc home today. Patient will follow a cardiac diet, stop smoking, and begin daily exercise of walking for 30 minutes/day. Patient was seen and examined today and deemed stable for discharge home.     Consults:   Consults (From admission, onward)        Status Ordering Provider     Inpatient consult to Cardiology  Once     Provider:  Elier Ayoub MD    Completed SARAH YODER          No new Assessment & Plan notes have been filed under this hospital service  since the last note was generated.  Service: Hospital Medicine    Final Active Diagnoses:    Diagnosis Date Noted POA    PRINCIPAL PROBLEM:  Unstable angina [I20.0] 11/09/2018 Yes    Tobacco abuse [Z72.0] 11/09/2018 Yes    Other hyperlipidemia [E78.49] 11/01/2018 Yes      Problems Resolved During this Admission:       Discharged Condition: stable    Disposition: Home or Self Care    Follow Up:  Follow-up Information     HOME STUDY, SLEEP On 11/29/2018.           Elier Ayoub Md, MD On 12/19/2018.    Specialties:  Cardiology, Internal Medicine  Why:  f/u sleep study  Contact information:  [address] [facility] [address]  [phone]             Yo Bradford MD On 11/19/2018.    Specialty:  Family Medicine  Why:  hospital follow up/ routine follow up  Contact information:  [address] [facility] [address]  [phone]                 Patient Instructions:      Diet Cardiac     Notify your health care provider if you experience any of the following:  temperature >100.4     Notify your health care provider if you experience any of the following:  severe uncontrolled pain     Notify your health care provider if you experience any of the following:  difficulty breathing or increased cough     Notify your health care provider if you experience any of the following:  persistent dizziness, light-headedness, or visual disturbances     Activity as tolerated       Significant Diagnostic Studies: Labs:   BMP:   Recent Labs   Lab 11/09/18  0804   *      K 3.8      CO2 21*   BUN 9   CREATININE 0.8   CALCIUM 9.2   , CMP   Recent Labs   Lab 11/09/18  0804      K 3.8      CO2 21*   *   BUN 9   CREATININE 0.8   CALCIUM 9.2   PROT 7.0   ALBUMIN 3.9   BILITOT 1.6*   ALKPHOS 56   AST 22   ALT 26   ANIONGAP 10   ESTGFRAFRICA >60   EGFRNONAA >60   , CBC   Recent Labs   Lab 11/09/18  0804   WBC 8.52   HGB 13.9*   HCT 41.1      , Troponin   Recent Labs   Lab  11/09/18  0804   TROPONINI <0.006    and All labs within the past 24 hours have been reviewed    Pending Diagnostic Studies:     Procedure Component Value Units Date/Time    IR Heart Cath Images [[id number]] Resulted:  11/09/18 0948    Order Status:  Sent Lab Status:  In process Updated:  11/09/18 1251         Medications:  Reconciled Home Medications:      Medication List      CONTINUE taking these medications    aspirin 81 MG EC tablet  Commonly known as:  ECOTRIN  Take 81 mg by mouth once daily.     atorvastatin 40 MG tablet  Commonly known as:  LIPITOR  Take 1 tablet (40 mg total) by mouth once daily.     metoprolol tartrate 25 MG tablet  Commonly known as:  LOPRESSOR  Take 1 tablet (25 mg total) by mouth 2 (two) times daily.     nitroGLYCERIN 0.4 MG SL tablet  Commonly known as:  NITROSTAT  Place 1 tablet (0.4 mg total) under the tongue every 5 (five) minutes as needed for Chest pain.     pantoprazole 40 MG tablet  Commonly known as:  PROTONIX  Take 1 tablet (40 mg total) by mouth once daily.            Indwelling Lines/Drains at time of discharge:   Lines/Drains/Airways          None          Time spent on the discharge of patient: 45 minutes  Patient was seen and examined on the date of discharge and determined to be suitable for discharge.         NATALIA Yu  Department of Hospital Medicine  [facility]    98.1

## 2018-11-09 NOTE — TELEPHONE ENCOUNTER
----- Message from Gayle Meyer PA-C sent at 11/9/2018  3:11 PM CST -----  Needs hosp f/u with either myself or Dr. Ayoub next week    Please arrange    Thanks

## 2018-11-09 NOTE — HPI
49 y/o male with no significant PMHx that presented to the ED with c/o chest pain that onset gradually yesterday evening. The pain is described as pressure that is to Left chest that radiates down left arm. Pain is worse at rest, and somewhat relieved with activity. Patient had a positive Stress Test yesterday. Associated symptoms include intermittent dizziness and BLE weakness. Symptoms are intermittent and moderate. Exacerbated with rest and somewhat relieved with activity. Patient denies fever, chills, N/V/D, SOB, diaphoresis, dysuria, ankle edema, and all other symptoms at this time. Cardiology consulted and patient is scheduled for [facility] today.  He is a full code and his surrogate decision maker is his father, Jan.

## 2018-11-09 NOTE — TELEPHONE ENCOUNTER
Spoke with patient regarding complaints.  Patient states that he went to ER at [facility] this AM, where he is now.  Advised patient that he made a wise choice by going to ER.  Instructed to notify office with any questions/concerns.  Patient verbalized understanding.

## 2018-11-11 ENCOUNTER — PATIENT MESSAGE (OUTPATIENT)
Dept: CARDIOLOGY | Facility: CLINIC | Age: 48
End: 2018-11-11

## 2018-11-14 ENCOUNTER — PATIENT MESSAGE (OUTPATIENT)
Dept: INTERNAL MEDICINE | Facility: CLINIC | Age: 48
End: 2018-11-14

## 2018-11-19 ENCOUNTER — PATIENT MESSAGE (OUTPATIENT)
Dept: INTERNAL MEDICINE | Facility: CLINIC | Age: 48
End: 2018-11-19

## 2018-11-19 ENCOUNTER — TELEPHONE (OUTPATIENT)
Dept: INTERNAL MEDICINE | Facility: CLINIC | Age: 48
End: 2018-11-19

## 2018-11-19 ENCOUNTER — OFFICE VISIT (OUTPATIENT)
Dept: INTERNAL MEDICINE | Facility: CLINIC | Age: 48
End: 2018-11-19
Payer: MEDICAID

## 2018-11-19 VITALS
HEART RATE: 75 BPM | WEIGHT: 196 LBS | TEMPERATURE: 97 F | SYSTOLIC BLOOD PRESSURE: 104 MMHG | OXYGEN SATURATION: 98 % | BODY MASS INDEX: 30.76 KG/M2 | DIASTOLIC BLOOD PRESSURE: 68 MMHG | HEIGHT: 67 IN

## 2018-11-19 DIAGNOSIS — Z00.00 ROUTINE CHECK-UP: Primary | ICD-10-CM

## 2018-11-19 PROBLEM — I20.0 UNSTABLE ANGINA: Status: RESOLVED | Noted: 2018-11-09 | Resolved: 2018-11-19

## 2018-11-19 PROCEDURE — 99213 OFFICE O/P EST LOW 20 MIN: CPT | Mod: PBBFAC | Performed by: FAMILY MEDICINE

## 2018-11-19 PROCEDURE — 99396 PREV VISIT EST AGE 40-64: CPT | Mod: S$PBB,,, | Performed by: FAMILY MEDICINE

## 2018-11-19 PROCEDURE — 99999 PR PBB SHADOW E&M-EST. PATIENT-LVL III: CPT | Mod: PBBFAC,,, | Performed by: FAMILY MEDICINE

## 2018-11-19 NOTE — PATIENT INSTRUCTIONS
Prevention Guidelines, Men Ages 40 to 49  Screening tests and vaccines are an important part of managing your health. Health counseling is essential, too. Below are guidelines for these, for men ages 40 to 49. Talk with your healthcare provider to make sure youre up to date on what you need.  Screening Who needs it How often   Alcohol misuse All men in this age group At routine exams   Blood pressure All men in this age group Every 2 years if your blood pressure reading is less than 120/80 mm Hg; yearly if your systolic blood pressure reading is 120 to 139 mm Hg, or your diastolic blood pressure reading is 80 to 89 mm Hg   Depression All men in this age group At routine exams   Type 2 diabetes or prediabetes All adults beginning at age 45 and adults without symptoms at any age who are overweight or obese and have 1 or more other risk factors for diabetes At least every 3 years (yearly if blood sugar has begun to rise)   Hepatitis C Men at increased risk for infection - talk with your healthcare provider At routine exams   High cholesterol or triglycerides All men ages 35 and older, and younger men at high risk for coronary artery disease At least every 5 years   HIV All men At routine exams   Obesity All men in this age group At routine exams   Prostate cancer Starting at age 45, talk to healthcare provider about risks and benefits of digital rectal exam (STACY) and prostate-specific antigen (PSA) screening1 At routine exams   Syphilis Men at increased risk for infection - talk with your healthcare provider At routine exams   Tuberculosis Men at increased risk for infection - talk with your healthcare provider Check with your healthcare provider   Vision All men in this age group Every 2 to 4 years if no risk factors for eye disease2   Vaccine Who needs it How often   Chickenpox (varicella) All men in this age group who have no record of this infection or vaccine 2 doses; the second dose should be given at least 4  weeks after the first dose   Hepatitis A Men at increased risk for infection - talk with your healthcare provider 2 doses given at least 6 months apart   Hepatitis B Men at increased risk for infection - talk with your healthcare provider 3 doses over 6 months; second dose should be given 1 month after the first dose; the third dose should be given at least 2 months after the second dose and at least 4 months after the first dose   Haemophilus influenzae Type B (HIB) Men at increased risk for infection - talk with your healthcare provider 1 to 3 doses   Influenza (flu) All men in this age group Once a year   Measles, mumps, rubella (MMR) All men in this age group who have no record of these infections or vaccines 1 or 2 doses   Meningococcal Men at increased risk for infection - talk with your healthcare provider 1 or more doses   Pneumococcal conjugate vaccine (PCV13) and pneumococcal polysaccharide vaccine (PPSV23) Men at increased risk for infection - talk with your healthcare provider PCV13: 1 dose ages 19 to 65 (protects against 13 types of pneumococcal bacteria)     PPSV23: 1 to 2 doses through age 64, or 1 dose at 65 or older (protects against 23 types of pneumococcal bacteria)      Tetanus/diphtheria/  pertussis (Td/Tdap) booster All men in this age group Td every 10 years, or a one-time dose of Tdap instead of a Td booster after age 18, then Td every 10 years   Counseling Who needs it How often   Diet and exercise Men who are overweight or obese When diagnosed, and then at routine exams   Sexually transmitted infection prevention Men at increased risk for infection - talk with your healthcare provider At routine exams   Use of daily aspirin Men ages 45 to 79 at risk for cardiovascular health problems At routine exams   Use of tobacco and the health effects it can cause All men in this age group Every exam   [address] [place] [address] [facility]   [facility] [employer]  Date Last Reviewed:  2/1/2017 © 2000-2017 [employer]. [address] [place] [address], [place] [address]. All rights reserved. This information is not intended as a substitute for professional medical care. Always follow your healthcare professional's instructions.        Eating Heart-Healthy Food: Using the DASH Plan    Eating for your heart doesnt have to be hard or boring. You just need to know how to make healthier choices. The DASH eating plan has been developed to help you do just that. DASH stands for Dietary Approaches to Stop Hypertension. It is a plan that has been proven to be healthier for your heart and to lower your risk for high blood pressure. It can also help lower your risk for cancer, heart disease, osteoporosis, and diabetes.  Choosing from each food group  Choose foods from each of the food groups below each day. Try to get the recommended number of servings for each food group. The serving numbers are based on a diet of 2,000 calories a day. Talk to your doctor if youre unsure about your calorie needs. Along with getting the correct servings, the DASH plan also recommends a sodium intake less than 2,300 mg per day.        Grains  Servings: 6 to 8 a day  A serving is:  · 1 slice bread  · 1 ounce dry cereal  · Half a cup cooked rice, pasta or cereal  Best choices: Whole grains and any grains high in fiber. Vegetables  Servings: 4 to 5 a day  A serving is:  · 1 cup raw leafy vegetable  · Half a cup cut-up raw or cooked vegetable  · Half a cup vegetable juice  Best choices: Fresh or frozen vegetables prepared without added salt or fat.   Fruits  Servings: 4 to 5 a day  A serving is:  · 1 medium fruit  · One-quarter cup dried fruit  · Half a cup fresh, frozen, or canned fruit  · Half a cup of 100% fruit juices  Best choices: A variety of fresh fruits of different colors. Whole fruits are a better choice than fruit juices. Low-fat or fat-free dairy  Servings: 2 to 3 a day  A serving is:  · 1 cup milk  · 1 cup  yogurt  · One and a half ounces cheese  Best choices: Skim or 1% milk, low-fat or fat-free yogurt or buttermilk, and low-fat cheeses.         Lean meats, poultry, fish  Servings: 6 or fewer a day  A serving is:  · 1 ounce cooked meats, poultry, or fish  · 1 egg  Best choices: Lean poultry and fish. Trim away visible fat. Broil, grill, roast, or boil instead of frying. Remove skin from poultry before eating. Limit how much red meat you eat.  Nuts, seeds, beans  Servings: 4 to 5 a week  A serving is:  · One-third cup nuts (one and a half ounces)  · 2 tablespoons nut butter or seeds  · Half a cup cooked dry beans or legumes  Best choices: Dry roasted nuts with no salt added, lentils, kidney beans, garbanzo beans, and whole barboza beans.   Fats and oils  Servings: 2 to 3 a day  A serving is:  · 1 teaspoon vegetable oil  · 1 teaspoon soft margarine  · 1 tablespoon mayonnaise  · 2 tablespoons salad dressing  Best choices: Nut and vegetable oils (nontropical vegetable oils), such as olive and canola oil. Sweets  Servings: 5 a week or fewer  A serving is:  · 1 tablespoon sugar, maple syrup, or honey  · 1 tablespoon jam or jelly  · 1 half-ounce jelly beans (about 15)  · 1 cup lemonade  Best choices: Dried fruit can be a satisfying sweet. Choose low-fat sweets. And watch your serving sizes!      For more on the DASH eating plan, visit:  www.nhlbi.nih.gov/health/health-topics/topics/dash   Date Last Reviewed: 6/1/2016  © 2705-3447 [employer]. [address] [place] [address], [place] [address]. All rights reserved. This information is not intended as a substitute for professional medical care. Always follow your healthcare professional's instructions.

## 2018-11-19 NOTE — PROGRESS NOTES
Lv Joyce  11/19/2018  [id number]    Yo Bradford MD  Patient Care Team:  Yo Bradford MD as PCP - General (Family Medicine)  Has the patient seen any provider outside of the [facility] network since the last visit? (no). If yes, HIPPA forms completed and records requested.        Visit Type:a scheduled routine follow-up visit    Chief Complaint:  Chief Complaint   Patient presents with    Follow-up     1 month f/u       History of Present Illness:  48 year old here for 1 month followup  He initally saw Dr. Bradford. This is my first appt with him.  Since seeing Dr. Bradford he had a Stress test with Cards, and it was negative.    He also had consult with Sleep medicine, and reports they advise he does not need sleep study.  [facility] Schwab. He reports that he was told he had stress and anxiety as the cause of his sleep issues.  He was given Melatonin, and he has been taking the 0.5, and it has helped.     He is here because he was having flush feeling on Saturday. He reports he was unclear why it was happening. He reports admits he has been feeling better overall but has had some episodes of feeling weird. Last one was at gym when working out. . He reports over the weekend he did have a pain in his chest, he reports a little pain, mild it went away. He reports that it was worse with deep breath.    When asked, he doesn't feel that he is stressed out. He reports that his friends and co workers feel that he is high strung.     Labs done with Dr. Bradford.  Cholesterol Marginal.  The 10-year ASCVD risk score (Jeremiah DC Jr., et al., 2013) is: 5.9%    Values used to calculate the score:      Age: 48 years      Sex: Male      Is Non- : No      Diabetic: No      Tobacco smoker: Yes      Systolic Blood Pressure: 104 mmHg      Is BP treated: No      HDL Cholesterol: 37 mg/dL      Total Cholesterol: 202 mg/dL  HgA1c 5.7, Dr. Bradford advised lifestyle changes.  He is  on LIpitor, ASA for risk reductions.            History:  History reviewed. No pertinent past medical history.  Past Surgical History:   Procedure Laterality Date    CATHETERIZATION, HEART, LEFT Left 11/9/2018    Performed by Waylon Galvan MD at [facility]    LEFT HEART CATHETERIZATION Left 11/9/2018    Procedure: CATHETERIZATION, HEART, LEFT;  Surgeon: Waylon Galvan MD;  Location: [facility] CATH LAB;  Service: Cardiology;  Laterality: Left;     Family History   Problem Relation Age of Onset    Hyperlipidemia Mother     Hypertension Mother     Hyperlipidemia Father     No Known Problems Brother     No Known Problems Brother      Social History     Socioeconomic History    Marital status:      Spouse name: Not on file    Number of children: Not on file    Years of education: Not on file    Highest education level: Not on file   Social Needs    Financial resource strain: Not on file    Food insecurity - worry: Not on file    Food insecurity - inability: Not on file    Transportation needs - medical: Not on file    Transportation needs - non-medical: Not on file   Occupational History    Not on file   Tobacco Use    Smoking status: Current Every Day Smoker     Packs/day: 1.00     Years: 30.00     Pack years: 30.00     Types: Cigarettes    Smokeless tobacco: Never Used   Substance and Sexual Activity    Alcohol use: No     Frequency: Never    Drug use: No    Sexual activity: Not on file   Other Topics Concern    Not on file   Social History Narrative    Not on file     Patient Active Problem List   Diagnosis    Other chest pain    Lightheadedness    Other hyperlipidemia    Tobacco abuse     Review of patient's allergies indicates:  No Known Allergies    The following were reviewed at this visit: active problem list, medication list, allergies, family history, social history, and health maintenance.    Medications:  Current Outpatient Medications on File Prior to Visit   Medication  Sig Dispense Refill    aspirin (ECOTRIN) 81 MG EC tablet Take 81 mg by mouth once daily.      pantoprazole (PROTONIX) 40 MG tablet Take 1 tablet (40 mg total) by mouth once daily. 30 tablet 3    atorvastatin (LIPITOR) 40 MG tablet Take 1 tablet (40 mg total) by mouth once daily. 30 tablet 11    metoprolol tartrate (LOPRESSOR) 25 MG tablet Take 1 tablet (25 mg total) by mouth 2 (two) times daily. 60 tablet 11    nitroGLYCERIN (NITROSTAT) 0.4 MG SL tablet Place 1 tablet (0.4 mg total) under the tongue every 5 (five) minutes as needed for Chest pain. 30 tablet 0     No current facility-administered medications on file prior to visit.        Medications have been reviewed and reconciled with patient at this visit.  Barriers to medications present (no)    Adverse reactions to current medications (no)    Over the counter medications reviewed (Yes ), and if needed added to active Medication list at this visit.     Exam:  Wt Readings from Last 3 Encounters:   11/19/18 88.9 kg (195 lb 15.8 oz)   11/09/18 91.7 kg (202 lb 2.6 oz)   11/01/18 92.9 kg (204 lb 12.9 oz)     Temp Readings from Last 3 Encounters:   11/19/18 97.3 °F (36.3 °C) (Tympanic)   11/09/18 97.9 °F (36.6 °C) (Oral)   10/18/18 98 °F (36.7 °C) (Tympanic)     BP Readings from Last 3 Encounters:   11/19/18 104/68   11/09/18 126/80   11/01/18 118/84     Pulse Readings from Last 3 Encounters:   11/19/18 75   11/09/18 83   11/01/18 72     Body mass index is 30.7 kg/m².      Review of Systems   Constitutional: Negative for chills and fever.   HENT: Negative.  Negative for congestion, sinus pain and sore throat.    Eyes: Negative for blurred vision and double vision.   Respiratory: Positive for cough. Negative for sputum production, shortness of breath and wheezing.    Cardiovascular: Positive for chest pain. Negative for palpitations and leg swelling.   Gastrointestinal: Negative for abdominal pain, constipation, diarrhea, heartburn, nausea and vomiting.    Genitourinary: Negative.    Musculoskeletal: Negative.    Skin: Negative.  Negative for rash.   Neurological: Positive for weakness.   Endo/Heme/Allergies: Negative.  Negative for polydipsia. Does not bruise/bleed easily.   Psychiatric/Behavioral: Negative for depression and substance abuse.     Physical Exam   Constitutional: He is oriented to person, place, and time. He appears well-developed and well-nourished. No distress.   HENT:   Head: Normocephalic and atraumatic.   Right Ear: External ear normal.   Left Ear: External ear normal.   Nose: Nose normal.   Mouth/Throat: Oropharynx is clear and moist. No oropharyngeal exudate.   Eyes: Conjunctivae and EOM are normal. Pupils are equal, round, and reactive to light. Right eye exhibits no discharge. Left eye exhibits no discharge.   Neck: Normal range of motion. Neck supple. No thyromegaly present.   Cardiovascular: Normal rate, regular rhythm, normal heart sounds and intact distal pulses.   No murmur heard.  Pulmonary/Chest: Effort normal and breath sounds normal. No respiratory distress. He has no wheezes.   Abdominal: Soft. Bowel sounds are normal. He exhibits no distension and no mass. There is no tenderness.   Musculoskeletal: Normal range of motion. He exhibits no edema.   Lymphadenopathy:     He has no cervical adenopathy.   Neurological: He is alert and oriented to person, place, and time. No cranial nerve deficit.   Skin: Capillary refill takes less than 2 seconds. He is not diaphoretic.   Psychiatric: He has a normal mood and affect. His behavior is normal. Judgment and thought content normal.   Nursing note and vitals reviewed.      Laboratory Reviewed ({Yes)  Lab Results   Component Value Date    WBC 8.52 11/09/2018    HGB 13.9 (L) 11/09/2018    HCT 41.1 11/09/2018     11/09/2018    CHOL 202 (H) 10/20/2018    TRIG 169 (H) 10/20/2018    HDL 37 (L) 10/20/2018    ALT 26 11/09/2018    AST 22 11/09/2018     11/09/2018    K 3.8 11/09/2018    CL  105 11/09/2018    CREATININE 0.8 11/09/2018    BUN 9 11/09/2018    CO2 21 (L) 11/09/2018    TSH 1.487 11/01/2018    GLUF 110 01/30/2004    HGBA1C 5.7 (H) 10/20/2018       Lv was seen today for follow-up.    Diagnoses and all orders for this visit:    Routine check-up    Reviewed Cath report, Labs with patient  I believe recent sx are happening as result of caloric restriction and exercise. Suspect hypoglycemia due to this. Calorie tracker shows at times he is consuming around 1200 calories, which is not sufficent.      Plan to increase calories, in form of lean protein and fruits and veggies. (He has lost 10 pounds in 2 weeks). Hydrate with water and monitor BP.    Recheck in 1 year with Dr. Bradford              Care Plan/Goals: Reviewed  (N/A)  Goals     None          Follow up: Follow-up in about 1 year (around 11/19/2019).    After visit summary was printed and given to patient upon discharge today.  Patient goals and care plan are included in After Visit Summary.

## 2018-11-19 NOTE — TELEPHONE ENCOUNTER
Spoke with the patient. He says that [facility]  The Dr said the that he does not think that it is a sleep thing and gave the patient melatonin.  The patient denies chest pains, numbness and tingling in his hand and arms and  SOB.  He feels like it is anxiety.  He has a cath done with cardio and was told that jeffery thing is ok with that.  He says that he feels like he has chest discomfort when he eats the wrong foods.  I advised the patient to keep the appointment with Dr Rodriguez if he still feels discomfort. /sl

## 2019-01-03 ENCOUNTER — PATIENT MESSAGE (OUTPATIENT)
Dept: INTERNAL MEDICINE | Facility: CLINIC | Age: 49
End: 2019-01-03

## 2019-01-07 ENCOUNTER — OFFICE VISIT (OUTPATIENT)
Dept: CARDIOLOGY | Facility: CLINIC | Age: 49
End: 2019-01-07
Payer: MEDICAID

## 2019-01-07 VITALS
HEART RATE: 76 BPM | WEIGHT: 193.81 LBS | SYSTOLIC BLOOD PRESSURE: 112 MMHG | DIASTOLIC BLOOD PRESSURE: 72 MMHG | HEIGHT: 67 IN | BODY MASS INDEX: 30.42 KG/M2

## 2019-01-07 DIAGNOSIS — R07.89 OTHER CHEST PAIN: ICD-10-CM

## 2019-01-07 DIAGNOSIS — I25.10 NONOBSTRUCTIVE ATHEROSCLEROSIS OF CORONARY ARTERY: ICD-10-CM

## 2019-01-07 DIAGNOSIS — Z72.0 TOBACCO ABUSE: ICD-10-CM

## 2019-01-07 DIAGNOSIS — E78.49 OTHER HYPERLIPIDEMIA: ICD-10-CM

## 2019-01-07 DIAGNOSIS — R00.2 PALPITATIONS: Primary | ICD-10-CM

## 2019-01-07 PROCEDURE — 99214 OFFICE O/P EST MOD 30 MIN: CPT | Mod: S$PBB,,, | Performed by: INTERNAL MEDICINE

## 2019-01-07 PROCEDURE — 99999 PR PBB SHADOW E&M-EST. PATIENT-LVL III: CPT | Mod: PBBFAC,,, | Performed by: INTERNAL MEDICINE

## 2019-01-07 PROCEDURE — 99999 PR PBB SHADOW E&M-EST. PATIENT-LVL III: ICD-10-PCS | Mod: PBBFAC,,, | Performed by: INTERNAL MEDICINE

## 2019-01-07 PROCEDURE — 99214 PR OFFICE/OUTPT VISIT, EST, LEVL IV, 30-39 MIN: ICD-10-PCS | Mod: S$PBB,,, | Performed by: INTERNAL MEDICINE

## 2019-01-07 PROCEDURE — 99213 OFFICE O/P EST LOW 20 MIN: CPT | Mod: PBBFAC,PO | Performed by: INTERNAL MEDICINE

## 2019-01-07 NOTE — PROGRESS NOTES
"Subjective:   Patient ID:  Lv Joyce is a 48 y.o. male who presents for cardiac consult of Chest Pain      Chest Pain    Associated symptoms include dizziness and palpitations. Pertinent negatives include no headaches.     The patient came in today for cardiac consult of Chest Pain    Referring Physician: Yo Bradford MD   Reason for consult: Chest pain      This is a 48 year old male pt with current medical conditions non-obs CAD, tobacco abuse, GERD, HLD presents for follow up CV evaluation.    11/1/18  He complains of chest pain described as numbness. Said something felt "funny". He cut back smoking a while ago but smokes a few cigs/day now.   Diet has improved. BP fluctuates.   He currently feels a fuzzy feeling. Feels maybe numbness. 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 Maybe once had gasping for air at night.   Does not snore lately, used to snore in the past.  If he is sitting down at lunch, he has to get up and move, maybe feels lightheaded. Has gained significant weight in past 5 years, less working out now.   Had outside 2D ECHO with friend that said heart was normal. He has presented to [facility] ED twice last month for chest pain/discomfort. Neg CV workup - trops, ECGS.  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     [facility] non-obs CAD - see below    1/7/18  Pt has been feeling palpitations over the weekend. Symptoms have been getting worse lately, even with lifting weights felt strange and tired. Sat morning he smoked a cig and symptoms became worse. Palpitations lasted a few seconds. BP was ok. No chest pain but felt congestions, no cough/fevers. No caffeine. He had a sleep apnea eval told he does not need sleep study. He has stopped BB and Statin due to possible side effects only taking asa and protonix. He has not had any GI workup for reflux.     Patient feels no sob, no leg swelling, no PND,  no syncope, " no CNS symptoms.    Patient has fairly good exercise tolerance.    Patient is compliant with medications.    FH - no CV disease; HTN in parents    [facility] 11/18  - Left Main Coronary Artery:             The LM is normal. There is TERRI 3 flow.     - Left Anterior Descending Artery:             The mid LAD has a 10% stenosis. There is TERRI 3 flow. The remaining portion of the vessel is normal.     - D1:             The D1 is normal. There is TERRI 3 flow.     - Left Circumflex Artery:             The LCX is normal. There is TERRI 3 flow.     - OM1:             The OM1 is normal. There is TERRI 3 flow.     - Right Coronary Artery:             The RCA is normal. There is TERRI 3 flow.    Past Medical History:   Diagnosis Date    Hyperlipidemia     Nonobstructive atherosclerosis of coronary artery 1/7/2019       Past Surgical History:   Procedure Laterality Date    CARDIAC CATHETERIZATION      CATHETERIZATION, HEART, LEFT Left 11/9/2018    Performed by Waylon Galvan MD at [facility] CATH LAB       Social History     Tobacco Use    Smoking status: Former Smoker     Packs/day: 1.00     Years: 30.00     Pack years: 30.00     Types: Cigarettes     Last attempt to quit: 1/5/2019    Smokeless tobacco: Never Used   Substance Use Topics    Alcohol use: No     Frequency: Never    Drug use: No       Family History   Problem Relation Age of Onset    Hyperlipidemia Mother     Hypertension Mother     Hyperlipidemia Father     No Known Problems Brother     No Known Problems Brother           Medication List           Accurate as of 1/7/19  8:48 AM. If you have any questions, ask your nurse or doctor.               CONTINUE taking these medications    aspirin 81 MG EC tablet  Commonly known as:  ECOTRIN     atorvastatin 40 MG tablet  Commonly known as:  LIPITOR  Take 1 tablet (40 mg total) by mouth once daily.     metoprolol tartrate 25 MG tablet  Commonly known as:  LOPRESSOR  Take 1 tablet (25 mg total) by mouth 2 (two) times  "daily.     nitroGLYCERIN 0.4 MG SL tablet  Commonly known as:  NITROSTAT  Place 1 tablet (0.4 mg total) under the tongue every 5 (five) minutes as needed for Chest pain.     pantoprazole 40 MG tablet  Commonly known as:  PROTONIX  Take 1 tablet (40 mg total) by mouth once daily.            Review of Systems   Constitutional: Negative.    HENT: Negative.    Eyes: Negative.    Respiratory: Negative.    Cardiovascular: Positive for chest pain and palpitations.   Gastrointestinal: Positive for heartburn.   Genitourinary: Negative.    Musculoskeletal: Negative.    Skin: Negative.    Neurological: Positive for dizziness. Negative for sensory change, focal weakness, loss of consciousness and headaches.   Endo/Heme/Allergies: Negative.    Psychiatric/Behavioral: Negative.    All 12 systems otherwise negative.      Wt Readings from Last 3 Encounters:   01/07/19 87.9 kg (193 lb 12.6 oz)   11/19/18 88.9 kg (195 lb 15.8 oz)   11/09/18 91.7 kg (202 lb 2.6 oz)     Temp Readings from Last 3 Encounters:   11/19/18 97.3 °F (36.3 °C) (Tympanic)   11/09/18 97.9 °F (36.6 °C) (Oral)   10/18/18 98 °F (36.7 °C) (Tympanic)     BP Readings from Last 3 Encounters:   01/07/19 112/72   11/19/18 104/68   11/09/18 126/80     Pulse Readings from Last 3 Encounters:   01/07/19 76   11/19/18 75   11/09/18 83       /72 (BP Method: Large (Manual))   Pulse 76   Ht 5' 7" (1.702 m)   Wt 87.9 kg (193 lb 12.6 oz)   BMI 30.35 kg/m²     Objective:   Physical Exam   Constitutional: He is oriented to person, place, and time. He appears well-developed and well-nourished. No distress.   HENT:   Head: Normocephalic and atraumatic.   Nose: Nose normal.   Mouth/Throat: Oropharynx is clear and moist.   Eyes: Conjunctivae and EOM are normal. No scleral icterus.   Neck: Normal range of motion. Neck supple. No JVD present. No thyromegaly present.   Cardiovascular: Normal rate, regular rhythm, S1 normal and S2 normal. Exam reveals no gallop, no S3, no S4 and " no friction rub.   No murmur heard.  Pulmonary/Chest: Effort normal and breath sounds normal. No stridor. No respiratory distress. He has no wheezes. He has no rales. He exhibits no tenderness.   Abdominal: Soft. Bowel sounds are normal. He exhibits no distension and no mass. There is no tenderness. There is no rebound.   Genitourinary:   Genitourinary Comments: Deferred   Musculoskeletal: Normal range of motion. He exhibits no edema, tenderness or deformity.   Lymphadenopathy:     He has no cervical adenopathy.   Neurological: He is alert and oriented to person, place, and time. He exhibits normal muscle tone. Coordination normal.   Skin: Skin is warm and dry. No rash noted. He is not diaphoretic. No erythema. No pallor.   Psychiatric: He has a normal mood and affect. His behavior is normal. Judgment and thought content normal.   Nursing note and vitals reviewed.      Lab Results   Component Value Date     11/09/2018    K 3.8 11/09/2018     11/09/2018    CO2 21 (L) 11/09/2018    BUN 9 11/09/2018    CREATININE 0.8 11/09/2018     (H) 11/09/2018    HGBA1C 5.7 (H) 10/20/2018    AST 22 11/09/2018    ALT 26 11/09/2018    ALBUMIN 3.9 11/09/2018    PROT 7.0 11/09/2018    BILITOT 1.6 (H) 11/09/2018    WBC 8.52 11/09/2018    HGB 13.9 (L) 11/09/2018    HCT 41.1 11/09/2018    MCV 85 11/09/2018     11/09/2018    TSH 1.487 11/01/2018    CHOL 202 (H) 10/20/2018    HDL 37 (L) 10/20/2018    LDLCALC 131.2 10/20/2018    TRIG 169 (H) 10/20/2018    BNP 12 11/09/2018     Assessment:      1. Palpitations    2. Nonobstructive atherosclerosis of coronary artery    3. Other hyperlipidemia    4. Other chest pain    5. Tobacco abuse        Plan:   1. Non-obs CAD  - minimal disease, rec asa, statin, bb  - discussed non cardiac causes of CP - cont PPI    2. Palpitations  - Holter  - rec BB    3. HLD  - statin    4. Obesity  - needs weight loss    Thank you for allowing me to participate in this patient's care. Please  do not hesitate to contact me with any questions or concerns. Consult note has been forwarded to the referral physician.

## 2019-01-09 ENCOUNTER — PATIENT MESSAGE (OUTPATIENT)
Dept: CARDIOLOGY | Facility: CLINIC | Age: 49
End: 2019-01-09

## 2019-01-10 ENCOUNTER — PATIENT MESSAGE (OUTPATIENT)
Dept: CARDIOLOGY | Facility: CLINIC | Age: 49
End: 2019-01-10

## 2019-01-13 ENCOUNTER — HOSPITAL ENCOUNTER (EMERGENCY)
Facility: HOSPITAL | Age: 49
Discharge: HOME | End: 2019-01-14
Attending: EMERGENCY MEDICINE
Payer: MEDICAID

## 2019-01-13 VITALS
BODY MASS INDEX: 30.51 KG/M2 | WEIGHT: 189.81 LBS | DIASTOLIC BLOOD PRESSURE: 74 MMHG | SYSTOLIC BLOOD PRESSURE: 119 MMHG | TEMPERATURE: 98 F | RESPIRATION RATE: 20 BRPM | OXYGEN SATURATION: 99 % | HEART RATE: 76 BPM | HEIGHT: 66 IN

## 2019-01-13 DIAGNOSIS — Z79.899 MEDICATION TAKEN AT HIGHER DOSE THAN RECOMMENDED: Primary | ICD-10-CM

## 2019-01-13 DIAGNOSIS — R45.89 FEELING ANXIOUS: ICD-10-CM

## 2019-01-13 PROCEDURE — 99283 EMERGENCY DEPT VISIT LOW MDM: CPT

## 2019-01-13 RX ORDER — HYDROXYZINE PAMOATE 25 MG/1
25 CAPSULE ORAL EVERY 4 HOURS PRN
Qty: 20 CAPSULE | Refills: 0 | Status: SHIPPED | OUTPATIENT
Start: 2019-01-13 | End: 2019-03-25 | Stop reason: SDUPTHER

## 2019-01-14 ENCOUNTER — CLINICAL SUPPORT (OUTPATIENT)
Dept: CARDIOLOGY | Facility: CLINIC | Age: 49
End: 2019-01-14
Attending: INTERNAL MEDICINE
Payer: MEDICAID

## 2019-01-14 DIAGNOSIS — R00.2 PALPITATIONS: ICD-10-CM

## 2019-01-14 PROCEDURE — 93226 XTRNL ECG REC<48 HR SCAN A/R: CPT | Mod: PBBFAC | Performed by: INTERNAL MEDICINE

## 2019-01-14 PROCEDURE — 93227 HOLTER MONITOR - 48 HOUR: ICD-10-PCS | Mod: S$PBB,,, | Performed by: INTERNAL MEDICINE

## 2019-01-14 PROCEDURE — 93227 XTRNL ECG REC<48 HR R&I: CPT | Mod: S$PBB,,, | Performed by: INTERNAL MEDICINE

## 2019-01-14 NOTE — ED PROVIDER NOTES
SCRIBE #1 NOTE: I, Angie Fisher, am scribing for, and in the presence of, Terri Grey NP. I have scribed the entire note.         History     Chief Complaint   Patient presents with    Drug Overdose     states took his medicine twice tonight - Lipitor and Metoprolol - wants to be checked.  Asymptomatic at this time.        Review of patient's allergies indicates:  No Known Allergies      History of Present Illness   HPI    1/13/2019, 10:21 PM  History obtained from the patient      History of Present Illness: Lv Joyce is a 48 y.o. male patient who presents to the Emergency Department for possible drug overdose which onset gradually tonight. Patient states he accidentally doubled his dose of Lopressor and Lipitor yesterday and today; took second dose at 8pm today. Patient states he wants to be checked. Patient states he was not previously compliant with his medications, but started them again last week. Patient states he is asymptomatic. Patient denies any fever, chills, fatigue, CP, SOB, palpitations, N/V, lightheadedness, dizziness, extremity weakness/numbness, and all other sxs at this time. Patient sees Dr. Ayoub for cardiology. No further complaints or concerns at this time.     Arrival mode: Personal vehicle      PCP: Yo Bradford MD        Past Medical History:  Past Medical History:   Diagnosis Date    Hyperlipidemia     Nonobstructive atherosclerosis of coronary artery 1/7/2019       Past Surgical History:  Past Surgical History:   Procedure Laterality Date    CARDIAC CATHETERIZATION      CATHETERIZATION, HEART, LEFT Left 11/9/2018    Performed by Waylon Galvan MD at [facility] CATH LAB         Family History:  Family History   Problem Relation Age of Onset    Hyperlipidemia Mother     Hypertension Mother     Hyperlipidemia Father     No Known Problems Brother     No Known Problems Brother        Social History:  Social History     Tobacco Use    Smoking status: Former Smoker      Packs/day: 1.00     Years: 30.00     Pack years: 30.00     Types: Cigarettes     Last attempt to quit: 2019     Years since quittin.0    Smokeless tobacco: Never Used   Substance and Sexual Activity    Alcohol use: No     Frequency: Never    Drug use: No    Sexual activity: Unknown        Review of Systems   Review of Systems   Constitutional: Negative for chills, fatigue and fever.   HENT: Negative for sore throat.    Respiratory: Negative for shortness of breath.    Cardiovascular: Negative for chest pain and palpitations.   Gastrointestinal: Negative for nausea and vomiting.   Genitourinary: Negative for dysuria.   Musculoskeletal: Negative for back pain.   Skin: Negative for rash.   Neurological: Negative for dizziness, weakness, light-headedness and numbness.   Hematological: Does not bruise/bleed easily.   All other systems reviewed and are negative.       Physical Exam     Initial Vitals [19 2149]   BP Pulse Resp Temp SpO2   (!) 143/71 75 16 98 °F (36.7 °C) 100 %      MAP       --          Physical Exam  Nursing Notes and Vital Signs Reviewed.  Constitutional: Patient is in no acute distress. Well-developed and well-nourished.  Head: Atraumatic. Normocephalic.  Eyes: PERRL. EOM intact. Conjunctivae are not pale. No scleral icterus.  ENT: Mucous membranes are moist. Oropharynx is clear and symmetric.    Neck: Supple. Full ROM. No lymphadenopathy.  Cardiovascular: Regular rate. Regular rhythm. No murmurs, rubs, or gallops. Distal pulses are 2+ and symmetric.  Pulmonary/Chest: No respiratory distress. Clear to auscultation bilaterally. No wheezing or rales.  Abdominal: Soft and non-distended.  There is no tenderness.  No rebound, guarding, or rigidity.   Musculoskeletal: Moves all extremities. No obvious deformities.   Skin: Warm and dry.  Neurological:  Alert, awake, and appropriate.  Normal speech.  No acute focal neurological deficits are appreciated.  Psychiatric: Normal affect. Good eye  "contact. Appropriate in content.     ED Course   Procedures  ED Vital Signs:  Vitals:    01/13/19 2149 01/13/19 2250   BP: (!) 143/71 119/74   Pulse: 75 76   Resp: 16 20   Temp: 98 °F (36.7 °C)    TempSrc: Oral    SpO2: 100% 99%   Weight: 86.1 kg (189 lb 13.1 oz)    Height: 5' 6" (1.676 m)                The Emergency Provider reviewed the vital signs and test results, which are outlined above.     ED Discussion     10:33 PM: Offered patient EKG, blood work with cardiac enzymes, but patient declined. Will repeat vital signs and discharge with medication for anxiety. Discussed with pt all pertinent ED information. Discussed pt dx and plan of tx. Gave pt all f/u and return to the ED instructions. All questions and concerns were addressed at this time. Pt expresses understanding of information and instructions, and is comfortable with plan to discharge. Pt is stable for discharge.  Discussed with patient to f/u with PCP in a couple days for re-evaluation.    I discussed with patient and/or family/caretaker that evaluation in the ED does not suggest any emergent or life threatening medical conditions requiring immediate intervention beyond what was provided in the ED, and I believe patient is safe for discharge.  Regardless, an unremarkable evaluation in the ED does not preclude the development or presence of a serious of life threatening condition. As such, patient was instructed to return immediately for any worsening or change in current symptoms.          ED Medication(s):  Medications - No data to display    Current Discharge Medication List      START taking these medications    Details   hydrOXYzine pamoate (VISTARIL) 25 MG Cap Take 1 capsule (25 mg total) by mouth every 4 (four) hours as needed.  Qty: 20 capsule, Refills: 0             Follow-up Information     Yo Bradford MD In 2 days.    Specialty:  Family Medicine  Why:  Follow up with your doctor for further evaluation, Return to ED for any " concerns.  Contact information:  [address] [facility]  [place] [address]  [phone]                        Medical Decision Making                 Scribe Attestation:   Scribe #1: I performed the above scribed service and the documentation accurately describes the services I performed. I attest to the accuracy of the note.     Attending:   Physician Attestation Statement for Scribe #1: I, Terri Grey NP, personally performed the services described in this documentation, as scribed by Angie Fisher, in my presence, and it is both accurate and complete.           Clinical Impression       ICD-10-CM ICD-9-CM   1. Medication taken at higher dose than recommended Z79.899 V58.69   2. Feeling anxious R45.89 300.00       Disposition:   Disposition: Discharged  Condition: Stable         Terri Grey NP  01/14/19 0027

## 2019-01-17 ENCOUNTER — PATIENT MESSAGE (OUTPATIENT)
Dept: INTERNAL MEDICINE | Facility: CLINIC | Age: 49
End: 2019-01-17

## 2019-01-25 ENCOUNTER — PATIENT MESSAGE (OUTPATIENT)
Dept: CARDIOLOGY | Facility: CLINIC | Age: 49
End: 2019-01-25

## 2019-01-25 DIAGNOSIS — G47.33 OSA (OBSTRUCTIVE SLEEP APNEA): Primary | ICD-10-CM

## 2019-01-25 DIAGNOSIS — G47.39 OTHER SLEEP APNEA: ICD-10-CM

## 2019-02-13 ENCOUNTER — PATIENT MESSAGE (OUTPATIENT)
Dept: INTERNAL MEDICINE | Facility: CLINIC | Age: 49
End: 2019-02-13

## 2019-02-13 ENCOUNTER — PATIENT MESSAGE (OUTPATIENT)
Dept: CARDIOLOGY | Facility: CLINIC | Age: 49
End: 2019-02-13

## 2019-02-18 ENCOUNTER — TELEPHONE (OUTPATIENT)
Dept: CARDIOLOGY | Facility: CLINIC | Age: 49
End: 2019-02-18

## 2019-02-18 NOTE — TELEPHONE ENCOUNTER
Called to patient and informed of appointment with Dr. Mendoza at [facility] the [facility] location on 3/4/19 at 3:40 P.M.--patient verbalizes understanding

## 2019-02-18 NOTE — TELEPHONE ENCOUNTER
----- Message from Joyce Chavarria sent at 2/18/2019  9:47 AM CST -----  Contact: self  Pt is calling to schedule appt for sleep study. Pt would like to be seen and I am unable to see next available appt with pulmonary provider. Please call pt back at [phone].      Thanks,   Joyce Chavarria

## 2019-02-18 NOTE — TELEPHONE ENCOUNTER
Called back to patient--states has received [facility] card and would like to schedule first available appointment with pulmonary for evaluation of sleep apnea. Informed patient that we will send a message to Dr. Mendoza's office to schedule. Patient verbalizes understanding

## 2019-02-18 NOTE — TELEPHONE ENCOUNTER
----- Message from Augusta Mccoy LPN sent at 2/18/2019  1:01 PM CST -----  Contact: self  I have put the patient in our 3:40 slot with Dr. Mendoza on 3/4.  We will be at the [place] location on 3/4.    ----- Message -----  From: Navin Sanchez LPN  Sent: 2/18/2019  12:06 PM  To: Kelly Huff Staff    This patient has CARLOS. Will you be able to schedule patient on 3/4/19 with Dr. Mendoza at 3:40 P.M.? Please let our office know when patient can be scheduled.    Thanks,    REIN Holden    ----- Message -----  From: Joyce Chavarria  Sent: 2/18/2019   9:47 AM  To: [facility] Staff    Pt is calling to schedule appt for sleep study. Pt would like to be seen and I am unable to see next available appt with pulmonary provider. Please call pt back at [phone].      Thanks,   Joyce Chavarria

## 2019-03-04 ENCOUNTER — OFFICE VISIT (OUTPATIENT)
Dept: PULMONOLOGY | Facility: CLINIC | Age: 49
End: 2019-03-04
Payer: MEDICAID

## 2019-03-04 VITALS
RESPIRATION RATE: 20 BRPM | WEIGHT: 189.94 LBS | OXYGEN SATURATION: 99 % | DIASTOLIC BLOOD PRESSURE: 72 MMHG | HEIGHT: 66 IN | HEART RATE: 80 BPM | SYSTOLIC BLOOD PRESSURE: 115 MMHG | BODY MASS INDEX: 30.53 KG/M2

## 2019-03-04 DIAGNOSIS — F17.210 SMOKING GREATER THAN 30 PACK YEARS: ICD-10-CM

## 2019-03-04 DIAGNOSIS — R05.9 COUGH: ICD-10-CM

## 2019-03-04 DIAGNOSIS — R29.818 SUSPECTED SLEEP APNEA: ICD-10-CM

## 2019-03-04 DIAGNOSIS — R07.89 CHEST TIGHTNESS: Primary | ICD-10-CM

## 2019-03-04 DIAGNOSIS — R06.83 SNORING: ICD-10-CM

## 2019-03-04 PROCEDURE — 99204 OFFICE O/P NEW MOD 45 MIN: CPT | Mod: S$PBB,,, | Performed by: INTERNAL MEDICINE

## 2019-03-04 PROCEDURE — 99999 PR PBB SHADOW E&M-EST. PATIENT-LVL III: ICD-10-PCS | Mod: PBBFAC,,, | Performed by: INTERNAL MEDICINE

## 2019-03-04 PROCEDURE — 99999 PR PBB SHADOW E&M-EST. PATIENT-LVL III: CPT | Mod: PBBFAC,,, | Performed by: INTERNAL MEDICINE

## 2019-03-04 PROCEDURE — 99204 PR OFFICE/OUTPT VISIT, NEW, LEVL IV, 45-59 MIN: ICD-10-PCS | Mod: S$PBB,,, | Performed by: INTERNAL MEDICINE

## 2019-03-04 PROCEDURE — 99213 OFFICE O/P EST LOW 20 MIN: CPT | Mod: PBBFAC | Performed by: INTERNAL MEDICINE

## 2019-03-04 NOTE — PROGRESS NOTES
Initial Outpatient Pulmonary Evaluation       SUBJECTIVE:     Chief Complaint   Patient presents with    Sleep Apnea       History of Present Illness:    Patient is a 48 y.o. male referred for evaluation of suspected sleep apnea.    Patient complains of snoring, has hypertension.    Over the last 3 months he was admitted to the hospital with chest tightness.  At some point he had a cough with yellowish sputum production.    Thirty pack year smoker.    No personal history of asthma or family history of asthma.    Currently unemployed.  He worked in the past as a .    Patient goes to bed 8:30 p.m. and wakes up at 3:30 a.m..      STOP - BANG Questionnaire:     1. Snoring : Do you snore loudly ?    Yes    2. Tired : Do you often feel tired, fatigued, or sleepy during daytime? Yes    3. Observed: Has anyone observed you stop breathing during your sleep?   No     4. Blood pressure : Do you have or are you being treated for high blood pressure?   Yes    5. BMI :BMI more than 35 kg/m2?   No    6. Age : Age over 50 yr old?   No    7. Neck circumference:   For male, is your shirt collar 17 inches / 43cm or larger?  For female, is your shirt collar 16 inches / 41cm or larger?    Yes    8. Gender: Gender male?   Yes    STOP BANG SCORE 5    High risk of CINDY: Yes 5 - 8  Intermediate risk of CINDY: Yes 3 - 4  Low risk of CINDY: Yes 0 - 2      References:   STOP Questionnaire   A Tool to Screen Patients for Obstructive Sleep Apnea: TEOFILO BullardR.C.P.C., BONNIE Holloway.B.B.S., Anali Alejandro M.D.,Jen Li, Ph.D., Mauro Phillips M.B.B.S.,_ Chip Barr.,_ Davie Lea M.D., Leon Mcmahan F.R.C.P.C.; Anesthesiology 2008; 108:812-21 Copyright © 2008, the American Society of Anesthesiologists, Inc. [employer]    Review of Systems   Constitutional: Negative for fever and chills.   HENT: Negative for  nosebleeds.    Eyes: Negative for redness.   Respiratory: Positive for snoring, cough and chest tightness. Negative for choking.    Cardiovascular: Positive for chest pain.   Genitourinary: Negative for hematuria.   Endocrine: Negative for cold intolerance.    Musculoskeletal: Positive for arthralgias. Negative for gait problem.   Gastrointestinal: Positive for acid reflux. Negative for vomiting.   Neurological: Negative for syncope.   Hematological: Negative for adenopathy.   Psychiatric/Behavioral: Negative for confusion.       Review of patient's allergies indicates:  No Known Allergies    Current Outpatient Medications   Medication Sig Dispense Refill    aspirin (ECOTRIN) 81 MG EC tablet Take 81 mg by mouth once daily.      atorvastatin (LIPITOR) 40 MG tablet Take 1 tablet (40 mg total) by mouth once daily. 30 tablet 11    metoprolol tartrate (LOPRESSOR) 25 MG tablet Take 1 tablet (25 mg total) by mouth 2 (two) times daily. 60 tablet 11    nitroGLYCERIN (NITROSTAT) 0.4 MG SL tablet Place 1 tablet (0.4 mg total) under the tongue every 5 (five) minutes as needed for Chest pain. 30 tablet 0    pantoprazole (PROTONIX) 40 MG tablet Take 1 tablet (40 mg total) by mouth once daily. 30 tablet 3    hydrOXYzine pamoate (VISTARIL) 25 MG Cap Take 1 capsule (25 mg total) by mouth every 4 (four) hours as needed. 20 capsule 0     No current facility-administered medications for this visit.        Past Medical History:   Diagnosis Date    Hyperlipidemia     Nonobstructive atherosclerosis of coronary artery 1/7/2019     Past Surgical History:   Procedure Laterality Date    CARDIAC CATHETERIZATION      CATHETERIZATION, HEART, LEFT Left 11/9/2018    Performed by Waylon Galvan MD at [facility] CATH LAB     Family History   Problem Relation Age of Onset    Hyperlipidemia Mother     Hypertension Mother     Hyperlipidemia Father     No Known Problems Brother     No Known Problems Brother      Social History     Tobacco Use  "   Smoking status: Current Every Day Smoker     Packs/day: 1.00     Years: 30.00     Pack years: 30.00     Types: Cigarettes     Start date: 1988    Smokeless tobacco: Never Used   Substance Use Topics    Alcohol use: No     Frequency: Never    Drug use: No          OBJECTIVE:     Vital Signs (Most Recent)  Vital Signs  Pulse: 80  Resp: 20  SpO2: 99 %  BP: 115/72  Height and Weight  Height: 5' 6" (167.6 cm)  Weight: 86.1 kg (189 lb 14.8 oz)  BSA (Calculated - sq m): 2 sq meters  BMI (Calculated): 30.7  Weight in (lb) to have BMI = 25: 154.6]  Wt Readings from Last 2 Encounters:   03/04/19 86.1 kg (189 lb 14.8 oz)   01/13/19 86.1 kg (189 lb 13.1 oz)         Physical Exam:  Physical Exam   Constitutional: He is oriented to person, place, and time. He appears well-developed.   HENT:   Head: Normocephalic.   Mouth/Throat: Mallampati Score: II.   Neck: Neck supple.   Neck circumference 17 in   Cardiovascular: Normal rate and regular rhythm.   Pulmonary/Chest: Normal expansion, effort normal and breath sounds normal. No stridor. No respiratory distress. He exhibits no tenderness.   Abdominal: Soft.   Musculoskeletal: He exhibits no tenderness.   Lymphadenopathy:     He has no cervical adenopathy.   Neurological: He is alert and oriented to person, place, and time. Gait normal.   Skin: Skin is warm.   Psychiatric: He has a normal mood and affect. His behavior is normal. Judgment and thought content normal.   Nursing note and vitals reviewed.      Laboratory  Lab Results   Component Value Date    WBC 8.52 11/09/2018    RBC 4.85 11/09/2018    HGB 13.9 (L) 11/09/2018    HCT 41.1 11/09/2018    MCV 85 11/09/2018    MCH 28.7 11/09/2018    MCHC 33.8 11/09/2018    RDW 13.3 11/09/2018     11/09/2018    MPV 9.4 11/09/2018    GRAN 5.8 11/09/2018    GRAN 68.3 11/09/2018    LYMPH 2.0 11/09/2018    LYMPH 23.9 11/09/2018    MONO 0.5 11/09/2018    MONO 6.2 11/09/2018    EOS 0.1 11/09/2018    BASO 0.03 11/09/2018    EOSINOPHIL " 1.2 11/09/2018    BASOPHIL 0.4 11/09/2018       BMP  Lab Results   Component Value Date     11/09/2018    K 3.8 11/09/2018     11/09/2018    CO2 21 (L) 11/09/2018    BUN 9 11/09/2018    CREATININE 0.8 11/09/2018    CALCIUM 9.2 11/09/2018    ANIONGAP 10 11/09/2018    ESTGFRAFRICA >60 11/09/2018    EGFRNONAA >60 11/09/2018    AST 22 11/09/2018    ALT 26 11/09/2018    PROT 7.0 11/09/2018       Lab Results   Component Value Date    BNP 12 11/09/2018    BNP <10 10/18/2018       Lab Results   Component Value Date    TSH 1.487 11/01/2018       No results found for: SEDRATE    No results found for: CRP      Diagnostic Results:    I have personally reviewed today the following studies :    Chest x-ray November 2018 clear lungs.    Cardiac catheterization November 2018    Minimal CAD.   Normal LVEF.    Echo in November 2018      1 - Normal left ventricular systolic function (EF 60-65%).     2 - Normal left ventricular diastolic function.     3 - No wall motion abnormalities.     4 - Normal right ventricular systolic function .     5 - Concentric remodeling.     ASSESSMENT/PLAN:     Chest tightness  -     Spirometry with/without bronchodilator; Future    Cough  -     Spirometry with/without bronchodilator; Future    Snoring  -     Home Sleep Studies; Future    Suspected sleep apnea  -     Home Sleep Studies; Future    Smoking greater than 30 pack years  -     Ambulatory referral to Smoking Cessation Program    Sleep hygiene recommendations were discussed with patient.    Advised him to delay his sleep by 30 min until achieving a bedtime between 10:00 p.m. and 11:00 p.m..    Consult 3-5 minutes about smoking cessation, patient agreeable for smoking cessation referral.  He has failed Chantix in the past.    Follow-up in about 2 months (around 5/4/2019).    This note was prepared using voice recognition system and is likely to have sound alike errors that may have been overlooked even after proof reading.  Please call  me with any questions    Discussed diagnosis, its evaluation, treatment and usual course. All questions answered.    Thank you for the courtesy of participating in the care of this patient    Refugio Mendoza MD

## 2019-03-04 NOTE — LETTER
March 4, 2019      Elier Ayoub MD  [address]  [place] [address]           [facility]  [address] [place] [id number]  Phone: [phone]  Fax: [phone]          Patient: Lv Joyce   MR Number: [id number]   YOB: 1970   Date of Visit: 3/4/2019       Dear Dr. Elier Ayoub:    Thank you for referring Lv Joyce to me for evaluation. Attached you will find relevant portions of my assessment and plan of care.    If you have questions, please do not hesitate to call me. I look forward to following Lv Joyce along with you.    Sincerely,    Refugio Mendoza MD    Enclosure  CC:  No Recipients    If you would like to receive this communication electronically, please contact [email or web address] or [phone] to request more information on [employer] Link access.    For providers and/or their staff who would like to refer a patient to [facility], please contact us through our one-stop-shop provider referral line, [facility] , at [phone].    If you feel you have received this communication in error or would no longer like to receive these types of communications, please e-mail [email or web address]

## 2019-03-04 NOTE — PATIENT INSTRUCTIONS
Improving sleep hygiene was discussed with the patient  Advise to Sleep as long as necessary to feel rested (usually seven to eight hours for adults) and then get out of bed  Maintain a regular sleep schedule, particularly a regular wake-up time in the morning  Try not to force sleep  Avoid caffeinated beverages after lunch, alcohol near bedtime , smoking or other nicotine intake, particularly during the evening  Adjust the bedroom environment as needed to decrease stimuli (reduce ambient light, turn off the television or radio)  Avoid prolonged use of light-emitting screens (laptops, tablets, smartphones, Reviv[employer]s) before bedtime   Resolve concerns or worries before bedtime  Exercise regularly for at least 20 minutes, preferably more than four to five hours prior to bedtime   Avoid daytime naps, especially if they are longer than 20 to 30 minutes or occur late in the day

## 2019-03-06 ENCOUNTER — TELEPHONE (OUTPATIENT)
Dept: PULMONOLOGY | Facility: CLINIC | Age: 49
End: 2019-03-06

## 2019-03-18 ENCOUNTER — PATIENT MESSAGE (OUTPATIENT)
Dept: PULMONOLOGY | Facility: CLINIC | Age: 49
End: 2019-03-18

## 2019-03-18 ENCOUNTER — CLINICAL SUPPORT (OUTPATIENT)
Dept: SMOKING CESSATION | Facility: CLINIC | Age: 49
End: 2019-03-18
Payer: COMMERCIAL

## 2019-03-18 DIAGNOSIS — F17.210 LIGHT CIGARETTE SMOKER (1-9 CIGS/DAY): Primary | ICD-10-CM

## 2019-03-18 PROCEDURE — 99999 PR PBB SHADOW E&M-EST. PATIENT-LVL I: CPT | Mod: PBBFAC,,,

## 2019-03-18 PROCEDURE — 99404 PREV MED CNSL INDIV APPRX 60: CPT | Mod: S$GLB,,,

## 2019-03-18 PROCEDURE — 99404 PR PREVENT COUNSEL,INDIV,60 MIN: ICD-10-PCS | Mod: S$GLB,,,

## 2019-03-18 PROCEDURE — 99999 PR PBB SHADOW E&M-EST. PATIENT-LVL I: ICD-10-PCS | Mod: PBBFAC,,,

## 2019-03-18 NOTE — PROGRESS NOTES
[facility]. He states is knows he should quit, but not sure if he wants to. Described the program and medication and he did agree to a next session, but to no medication. He has cut back form 20-5-6 cigarettes.day on his own. Explained the importance to quitting -to help GERD, CAD, feel better- which he states he cannot walk far due to SOB, sleep better.- but appears not to want to engage totally in the program. The patient will continue individual sessions .

## 2019-03-18 NOTE — Clinical Note
[facility]. He states is knows he should quit, but not sure if he wants to. Described the program and medication and he did agree to a next session, but to no medication. He has cut back form 20-5-6 cigarettes.day on his own. Explained the importance to quitting -to help GERD, CAD, feel better- which he states he cannot walk far due to SOB, sleep better.- but appears not to want to engage totally in the program. The patient will continue individual sessions .

## 2019-03-21 ENCOUNTER — HOSPITAL ENCOUNTER (EMERGENCY)
Facility: HOSPITAL | Age: 49
Discharge: HOME | End: 2019-03-21
Attending: EMERGENCY MEDICINE
Payer: MEDICAID

## 2019-03-21 ENCOUNTER — PROCEDURE VISIT (OUTPATIENT)
Dept: SLEEP MEDICINE | Facility: CLINIC | Age: 49
End: 2019-03-21
Payer: MEDICAID

## 2019-03-21 VITALS
SYSTOLIC BLOOD PRESSURE: 117 MMHG | TEMPERATURE: 98 F | HEIGHT: 66 IN | WEIGHT: 188.25 LBS | HEART RATE: 65 BPM | DIASTOLIC BLOOD PRESSURE: 73 MMHG | BODY MASS INDEX: 30.25 KG/M2 | RESPIRATION RATE: 14 BRPM | OXYGEN SATURATION: 97 %

## 2019-03-21 DIAGNOSIS — Z71.6 TOBACCO ABUSE COUNSELING: ICD-10-CM

## 2019-03-21 DIAGNOSIS — R06.83 SNORING: ICD-10-CM

## 2019-03-21 DIAGNOSIS — G47.33 MILD OBSTRUCTIVE SLEEP APNEA: Primary | ICD-10-CM

## 2019-03-21 DIAGNOSIS — F41.9 ANXIETY: Primary | ICD-10-CM

## 2019-03-21 DIAGNOSIS — R07.9 CHEST PAIN: ICD-10-CM

## 2019-03-21 LAB
ALBUMIN SERPL BCP-MCNC: 4.1 G/DL
ALP SERPL-CCNC: 75 U/L
ALT SERPL W/O P-5'-P-CCNC: 29 U/L
ANION GAP SERPL CALC-SCNC: 12 MMOL/L
AST SERPL-CCNC: 18 U/L
BASOPHILS # BLD AUTO: 0.05 K/UL
BASOPHILS NFR BLD: 0.4 %
BILIRUB SERPL-MCNC: 0.9 MG/DL
BILIRUB UR QL STRIP: NEGATIVE
BNP SERPL-MCNC: <10 PG/ML
BUN SERPL-MCNC: 17 MG/DL
CALCIUM SERPL-MCNC: 9.4 MG/DL
CHLORIDE SERPL-SCNC: 103 MMOL/L
CK SERPL-CCNC: 90 U/L
CLARITY UR: CLEAR
CO2 SERPL-SCNC: 22 MMOL/L
COLOR UR: YELLOW
CREAT SERPL-MCNC: 0.9 MG/DL
DIFFERENTIAL METHOD: ABNORMAL
EOSINOPHIL # BLD AUTO: 0.4 K/UL
EOSINOPHIL NFR BLD: 2.6 %
ERYTHROCYTE [DISTWIDTH] IN BLOOD BY AUTOMATED COUNT: 14 %
EST. GFR  (AFRICAN AMERICAN): >60 ML/MIN/1.73 M^2
EST. GFR  (NON AFRICAN AMERICAN): >60 ML/MIN/1.73 M^2
GLUCOSE SERPL-MCNC: 126 MG/DL
GLUCOSE UR QL STRIP: NEGATIVE
HCT VFR BLD AUTO: 42.6 %
HGB BLD-MCNC: 14.3 G/DL
HGB UR QL STRIP: ABNORMAL
KETONES UR QL STRIP: NEGATIVE
LEUKOCYTE ESTERASE UR QL STRIP: NEGATIVE
LYMPHOCYTES # BLD AUTO: 4 K/UL
LYMPHOCYTES NFR BLD: 28.1 %
MCH RBC QN AUTO: 28.3 PG
MCHC RBC AUTO-ENTMCNC: 33.6 G/DL
MCV RBC AUTO: 84 FL
MONOCYTES # BLD AUTO: 1 K/UL
MONOCYTES NFR BLD: 7.3 %
NEUTROPHILS # BLD AUTO: 8.7 K/UL
NEUTROPHILS NFR BLD: 61.6 %
NITRITE UR QL STRIP: NEGATIVE
PH UR STRIP: 7 [PH] (ref 5–8)
PLATELET # BLD AUTO: 256 K/UL
PMV BLD AUTO: 9.8 FL
POTASSIUM SERPL-SCNC: 3.6 MMOL/L
PROT SERPL-MCNC: 7.3 G/DL
PROT UR QL STRIP: NEGATIVE
RBC # BLD AUTO: 5.06 M/UL
SODIUM SERPL-SCNC: 137 MMOL/L
SP GR UR STRIP: <=1.005 (ref 1–1.03)
TROPONIN I SERPL DL<=0.01 NG/ML-MCNC: <0.006 NG/ML
URN SPEC COLLECT METH UR: ABNORMAL
UROBILINOGEN UR STRIP-ACNC: NEGATIVE EU/DL
WBC # BLD AUTO: 14.05 K/UL

## 2019-03-21 PROCEDURE — 86703 HIV-1/HIV-2 1 RESULT ANTBDY: CPT

## 2019-03-21 PROCEDURE — 93005 ELECTROCARDIOGRAM TRACING: CPT

## 2019-03-21 PROCEDURE — 99285 EMERGENCY DEPT VISIT HI MDM: CPT | Mod: 25

## 2019-03-21 PROCEDURE — 81003 URINALYSIS AUTO W/O SCOPE: CPT

## 2019-03-21 PROCEDURE — 95800 SLP STDY UNATTENDED: CPT

## 2019-03-21 PROCEDURE — 93010 ELECTROCARDIOGRAM REPORT: CPT | Mod: ,,, | Performed by: INTERNAL MEDICINE

## 2019-03-21 PROCEDURE — 85025 COMPLETE CBC W/AUTO DIFF WBC: CPT

## 2019-03-21 PROCEDURE — 95800 SLP STDY UNATTENDED: CPT | Mod: 26,S$PBB,, | Performed by: INTERNAL MEDICINE

## 2019-03-21 PROCEDURE — 93010 EKG 12-LEAD: ICD-10-PCS | Mod: ,,, | Performed by: INTERNAL MEDICINE

## 2019-03-21 PROCEDURE — 95800 PR SLEEP STUDY, UNATTENDED, RECORD HEART RATE/O2 SAT/RESP ANAL/SLEEP TIME: ICD-10-PCS | Mod: 26,S$PBB,, | Performed by: INTERNAL MEDICINE

## 2019-03-21 PROCEDURE — 99499 NO LOS: ICD-10-PCS | Mod: S$PBB,,, | Performed by: INTERNAL MEDICINE

## 2019-03-21 PROCEDURE — 99499 UNLISTED E&M SERVICE: CPT | Mod: S$PBB,,, | Performed by: INTERNAL MEDICINE

## 2019-03-21 PROCEDURE — 80053 COMPREHEN METABOLIC PANEL: CPT

## 2019-03-21 PROCEDURE — 83880 ASSAY OF NATRIURETIC PEPTIDE: CPT

## 2019-03-21 PROCEDURE — 82550 ASSAY OF CK (CPK): CPT

## 2019-03-21 PROCEDURE — 84484 ASSAY OF TROPONIN QUANT: CPT

## 2019-03-21 PROCEDURE — 95800 SLP STDY UNATTENDED: CPT | Mod: PBBFAC,PN | Performed by: INTERNAL MEDICINE

## 2019-03-21 NOTE — Clinical Note
PHYSICIAN INTERPRETATION AND COMMENTS: Findings are consistent with mild, positional obstructive sleep apnea (CINDY). Overall AHI was 6.0/hr with 6.5 hours sleep. CLINICAL HISTORY: 48 year old male presented with: SNORING. STOPBANG score 5. Mallampati Score: II. 17 inch neck, BMI of 30, an Morton sleepiness score of 0, and history of hypertension. Based on the clinical history, the patient has a high pre-test probability of having moderate CINDY. SLEEP STUDY FINDINGS: Patient underwent a one night Home Sleep Test and by behavioral criteria, slept for approximately 6.5 hours, with a sleep latency of 20 minutes and a sleep efficiency of 92.6%. Mild sleep disordered breathing (AHI=6.0/hr) is noted based on a 4% hypopnea desaturation criteria, entirely in the supine position (9 events/hour). The patient slept supine 65.0% of the night based on valid recording time of 6.48 hours. When considering more subtle measures of sleep disordered breathing, the overall respiratory disturbance index is also mild (RDI=19) ba

## 2019-03-21 NOTE — PROCEDURES
Home Sleep Studies  Date/Time: 3/21/2019 5:41 PM  Performed by: Cameron Espinoza MD  Authorized by: Refugio Mendoza MD       PHYSICIAN INTERPRETATION AND COMMENTS: Findings are consistent with mild, positional obstructive sleep apnea (CINDY). Overall AHI was 6.0/hr with 6.5 hours sleep.   CLINICAL HISTORY: 48 year old male presented with: SNORING. STOPBANG score 5. Mallampati Score: II. 17 inch neck, BMI of 30, an R[place]house sleepiness score of 0, and history of hypertension. Based on the clinical history, the patient has a high pre-test probability of having moderate CINDY.   SLEEP STUDY FINDINGS: Patient underwent a one night Home Sleep Test and by behavioral criteria, slept for approximately 6.5 hours, with a sleep latency of 20 minutes and a sleep efficiency of 92.6%. Mild sleep disordered breathing (AHI=6.0/hr) is noted based on a 4% hypopnea desaturation criteria, entirely in the supine position (9 events/hour). The patient slept supine 65.0% of the night based on valid recording time of 6.48 hours. When considering more subtle measures of sleep disordered breathing, the overall respiratory disturbance index is also mild (RDI=19) based on a 1% hypopnea desaturation criteria with confirmation by surrogate arousal indicators. The apneas/hypopneas are accompanied by minimal oxygen desaturation (percent time below 90% SpO2: 0.0%, Min SpO2: 87.5%). The average desaturation across all sleep disordered breathing events is 2.5%. Snoring occurs for 3.5% (30 dB) of the study. The mean pulse rate is 67 BPM, with infrequent pulse rate variability (38 events with >= 6 BPM increase/decrease per hour).   TREATMENT CONSIDERATIONS: For symptomatic mild obstructive sleep apnea, patient preference and compliance impacts efficacious outcomes. Consider treatment with nasal continuous positive airway pressure (CPAP/AutoPAP), mandibular advancement splint (MAS), referral to an ENT surgeon for modification to the airway, and/or weight  loss or behavioral therapy. A Mandibular Advancement Splint (MAS) will likely provide treatment benefit independent of CINDY severity. The patient should avoid sleeping supine given non-supine AHI is in the normal range.   DISEASE MANAGEMENT CONSIDERATIONS: None

## 2019-03-21 NOTE — PROGRESS NOTES
PHYSICIAN INTERPRETATION AND COMMENTS: Findings are consistent with mild, positional obstructive sleep apnea (CINDY). Overall AHI was 6.0/hr with 6.5 hours sleep.   CLINICAL HISTORY: 48 year old male presented with: SNORING. STOPBANG score 5. Mallampati Score: II. 17 inch neck, BMI of 30, an [place] sleepiness score of 0, and history of hypertension. Based on the clinical history, the patient has a high pre-test probability of having moderate CINDY.   SLEEP STUDY FINDINGS: Patient underwent a one night Home Sleep Test and by behavioral criteria, slept for approximately 6.5 hours, with a sleep latency of 20 minutes and a sleep efficiency of 92.6%. Mild sleep disordered breathing (AHI=6.0/hr) is noted based on a 4% hypopnea desaturation criteria, entirely in the supine position (9 events/hour). The patient slept supine 65.0% of the night based on valid recording time of 6.48 hours. When considering more subtle measures of sleep disordered breathing, the overall respiratory disturbance index is also mild (RDI=19) based on a 1% hypopnea desaturation criteria with confirmation by surrogate arousal indicators. The apneas/hypopneas are accompanied by minimal oxygen desaturation (percent time below 90% SpO2: 0.0%, Min SpO2: 87.5%). The average desaturation across all sleep disordered breathing events is 2.5%. Snoring occurs for 3.5% (30 dB) of the study. The mean pulse rate is 67 BPM, with infrequent pulse rate variability (38 events with >= 6 BPM increase/decrease per hour).   TREATMENT CONSIDERATIONS: For symptomatic mild obstructive sleep apnea, patient preference and compliance impacts efficacious outcomes. Consider treatment with nasal continuous positive airway pressure (CPAP/AutoPAP), mandibular advancement splint (MAS), referral to an ENT surgeon for modification to the airway, and/or weight loss or behavioral therapy. A Mandibular Advancement Splint (MAS) will likely provide treatment benefit independent of CINDY  severity. The patient should avoid sleeping supine given non-supine AHI is in the normal range.   DISEASE MANAGEMENT CONSIDERATIONS: None

## 2019-03-22 ENCOUNTER — PATIENT MESSAGE (OUTPATIENT)
Dept: CARDIOLOGY | Facility: CLINIC | Age: 49
End: 2019-03-22

## 2019-03-22 ENCOUNTER — PATIENT MESSAGE (OUTPATIENT)
Dept: INTERNAL MEDICINE | Facility: CLINIC | Age: 49
End: 2019-03-22

## 2019-03-22 LAB — HIV 1+2 AB+HIV1 P24 AG SERPL QL IA: NEGATIVE

## 2019-03-22 NOTE — ED PROVIDER NOTES
SCRIBE #1 NOTE: I, Norasav Alas, am scribing for, and in the presence of, Linden Lei MD. I have scribed the HPI, ROS, and PEx.     SCRIBE #2 NOTE: I, Marichuy Mckee, am scribing for, and in the presence of,  Mina Zamarripa Jr., MD. I have scribed the remaining portions of the note not scribed by Scribe #1.      History     Chief Complaint   Patient presents with    Chest Pain     Pt reports intermittent left sided chest pain, w/ HTN, headache, and dizziness.      Review of patient's allergies indicates:  No Known Allergies      History of Present Illness     HPI    3/21/2019, 7:45 PM  History obtained from the patient      History of Present Illness: Lv Joyce is a 48 y.o. male patient with a PMHx of hyperlipidemia who presents to the Emergency Department for evaluation of CP which onset gradually days ago. Symptoms are constant and moderate in severity.  No mitigating or exacerbating factors reported. Associated sxs include fatigue. Pt states he usually walks his dog around the block, but lately he feels exhausted after the walk. Patient denies any fever, chills, cough, SOB, congestion, diaphoresis, n/v/d, weakness, numbness, leg swelling, palpitations, and all other sxs at this time. No prior tx. Pt states he stopped smoking 2 days ago. Pt had a normal heart cath in November 2018. No further complaints or concerns at this time.       Arrival mode: Personal vehicle      PCP: Yo Bradford MD        Past Medical History:  Past Medical History:   Diagnosis Date    Hyperlipidemia     Nonobstructive atherosclerosis of coronary artery 1/7/2019       Past Surgical History:  Past Surgical History:   Procedure Laterality Date    CARDIAC CATHETERIZATION      CATHETERIZATION, HEART, LEFT Left 11/9/2018    Performed by Waylon Galvan MD at [facility]         Family History:  Family History   Problem Relation Age of Onset    Hyperlipidemia Mother     Hypertension Mother     Hyperlipidemia  Father     No Known Problems Brother     No Known Problems Brother        Social History:  Social History     Tobacco Use    Smoking status: Current Every Day Smoker     Packs/day: 1.00     Years: 30.00     Pack years: 30.00     Types: Cigarettes, Vaping with nicotine     Start date: 1/1/1988    Smokeless tobacco: Never Used   Substance and Sexual Activity    Alcohol use: No     Frequency: Never    Drug use: No    Sexual activity: Unknown        Review of Systems     Review of Systems   Constitutional: Positive for fatigue. Negative for chills, diaphoresis and fever.   HENT: Negative for congestion, rhinorrhea and sore throat.    Respiratory: Negative for cough and shortness of breath.    Cardiovascular: Positive for chest pain. Negative for palpitations and leg swelling.   Gastrointestinal: Negative for abdominal pain, diarrhea, nausea and vomiting.   Genitourinary: Negative for dysuria and frequency.   Musculoskeletal: Negative for back pain and neck pain.   Skin: Negative for rash.   Neurological: Negative for dizziness, weakness, numbness and headaches.   Hematological: Does not bruise/bleed easily.   All other systems reviewed and are negative.       Physical Exam     Initial Vitals [03/21/19 1915]   BP Pulse Resp Temp SpO2   (!) 151/66 77 20 97.9 °F (36.6 °C) 98 %      MAP       --          Physical Exam  Nursing Notes and Vital Signs Reviewed.  Constitutional: Patient is in no acute distress. Well-developed and well-nourished.  Head: Atraumatic. Normocephalic.  Eyes: PERRL. EOM intact. Conjunctivae are not pale. No scleral icterus.  ENT: Mucous membranes are moist. Oropharynx is clear and symmetric.    Neck: Supple. Full ROM. No lymphadenopathy.  Cardiovascular: Regular rate. Regular rhythm. No murmurs, rubs, or gallops. Distal pulses are 2+ and symmetric.  Pulmonary/Chest: No respiratory distress. Clear to auscultation bilaterally. No wheezing or rales.  Abdominal: Soft and non-distended.  There is no  "tenderness.  No rebound, guarding, or rigidity.   Musculoskeletal: Moves all extremities. No obvious deformities. No edema. No calf tenderness.  Skin: Warm and dry.  Neurological:  Alert, awake, and appropriate.  Normal speech.  No acute focal neurological deficits are appreciated.  Psychiatric: Normal affect. Good eye contact. Appropriate in content.     ED Course   Procedures  ED Vital Signs:  Vitals:    03/21/19 1915 03/21/19 1920 03/21/19 1950 03/21/19 2049   BP: (!) 151/66   (!) 119/58   Pulse: 77 75 75 71   Resp: 20   16   Temp: 97.9 °F (36.6 °C)      TempSrc: Oral      SpO2: 98%   100%   Weight: 85.4 kg (188 lb 4.4 oz)      Height: 5' 6" (1.676 m)          Abnormal Lab Results:  Labs Reviewed   CBC W/ AUTO DIFFERENTIAL - Abnormal; Notable for the following components:       Result Value    WBC 14.05 (*)     Gran # (ANC) 8.7 (*)     All other components within normal limits   COMPREHENSIVE METABOLIC PANEL - Abnormal; Notable for the following components:    CO2 22 (*)     Glucose 126 (*)     All other components within normal limits   URINALYSIS, REFLEX TO URINE CULTURE - Abnormal; Notable for the following components:    Specific Gravity, UA <=1.005 (*)     Occult Blood UA Trace (*)     All other components within normal limits    Narrative:     Preferred Collection Type->Urine, Clean Catch   B-TYPE NATRIURETIC PEPTIDE   CK   TROPONIN I   HIV 1 / 2 ANTIBODY        All Lab Results:  Results for orders placed or performed during the hospital encounter of 03/21/19   CBC auto differential   Result Value Ref Range    WBC 14.05 (H) 3.90 - 12.70 K/uL    RBC 5.06 4.60 - 6.20 M/uL    Hemoglobin 14.3 14.0 - 18.0 g/dL    Hematocrit 42.6 40.0 - 54.0 %    MCV 84 82 - 98 fL    MCH 28.3 27.0 - 31.0 pg    MCHC 33.6 32.0 - 36.0 g/dL    RDW 14.0 11.5 - 14.5 %    Platelets 256 150 - 350 K/uL    MPV 9.8 9.2 - 12.9 fL    Gran # (ANC) 8.7 (H) 1.8 - 7.7 K/uL    Lymph # 4.0 1.0 - 4.8 K/uL    Mono # 1.0 0.3 - 1.0 K/uL    Eos # 0.4 " 0.0 - 0.5 K/uL    Baso # 0.05 0.00 - 0.20 K/uL    Gran% 61.6 38.0 - 73.0 %    Lymph% 28.1 18.0 - 48.0 %    Mono% 7.3 4.0 - 15.0 %    Eosinophil% 2.6 0.0 - 8.0 %    Basophil% 0.4 0.0 - 1.9 %    Differential Method Automated    Comprehensive metabolic panel   Result Value Ref Range    Sodium 137 136 - 145 mmol/L    Potassium 3.6 3.5 - 5.1 mmol/L    Chloride 103 95 - 110 mmol/L    CO2 22 (L) 23 - 29 mmol/L    Glucose 126 (H) 70 - 110 mg/dL    BUN, Bld 17 6 - 20 mg/dL    Creatinine 0.9 0.5 - 1.4 mg/dL    Calcium 9.4 8.7 - 10.5 mg/dL    Total Protein 7.3 6.0 - 8.4 g/dL    Albumin 4.1 3.5 - 5.2 g/dL    Total Bilirubin 0.9 0.1 - 1.0 mg/dL    Alkaline Phosphatase 75 55 - 135 U/L    AST 18 10 - 40 U/L    ALT 29 10 - 44 U/L    Anion Gap 12 8 - 16 mmol/L    eGFR if African American >60 >60 mL/min/1.73 m^2    eGFR if non African American >60 >60 mL/min/1.73 m^2   Urinalysis, Reflex to Urine Culture Urine, Clean Catch   Result Value Ref Range    Specimen UA Urine, Clean Catch     Color, UA Yellow Yellow, Straw, Ella    Appearance, UA Clear Clear    pH, UA 7.0 5.0 - 8.0    Specific Gravity, UA <=1.005 (A) 1.005 - 1.030    Protein, UA Negative Negative    Glucose, UA Negative Negative    Ketones, UA Negative Negative    Bilirubin (UA) Negative Negative    Occult Blood UA Trace (A) Negative    Nitrite, UA Negative Negative    Urobilinogen, UA Negative <2.0 EU/dL    Leukocytes, UA Negative Negative   Brain natriuretic peptide   Result Value Ref Range    BNP <10 0 - 99 pg/mL   CK   Result Value Ref Range    CPK 90 20 - 200 U/L   Troponin I   Result Value Ref Range    Troponin I <0.006 0.000 - 0.026 ng/mL         Imaging Results:  Imaging Results          X-Ray Chest PA And Lateral (Final result)  Result time 03/21/19 20:29:35    Final result by Law Del Real MD (Timothy) (03/21/19 20:29:35)                 Impression:      No acute findings.      Electronically signed by: Law Del Real MD  Date:    03/21/2019  Time:    20:29              Narrative:    EXAMINATION:  XR CHEST PA AND LATERAL    CLINICAL HISTORY  <Diagnosis>, chest pain;    COMPARISON:  11/09/2018    FINDINGS:  The heart size is normal.  The lung fields are clear.  No acute cardiopulmonary infiltrative.                                 The EKG was ordered, reviewed, and independently interpreted by the ED provider.  Interpretation time: 19:20  Rate: 75 BPM  Rhythm: normal sinus rhythm  Interpretation: No acute ST changes . No STEMI.           The Emergency Provider reviewed the vital signs and test results, which are outlined above.     ED Discussion     8:00 PM: Dr. Lei transfers care of pt to Dr. Zamarripa, pending workup results.    8:55 PM  Patient is stable nontoxic.  Had a heart catheterization a year ago that showed only 10% lesion to his LAD.  No other lesions noted. Patient has or 24 hr of symptoms. Notes that he becomes fatigued after exercising.  Denies any chest pain. No cough.  No wheezing.  He does smoke and has a history of underlying lung disease.  Also started a new job in notes that he is anxious and not sleeping well as result.  Cardiac workup is negative with symptoms over 24 hr.  He is established with .  Light of his negative workup after prolonged symptoms in November 2 1018-catheterization, is unlikely cardiac.  Patient is safe for discharge in my opinion.  I discussed all findings with the patient at length he verbalized understanding agreement with all.  He seems reliable.    9:03 PM: Reassessed pt at this time.  Pt states his condition has improved at this time. Discussed with pt all pertinent ED information and results. Discussed pt dx and plan of tx. Gave pt all f/u and return to the ED instructions. All questions and concerns were addressed at this time. Pt expresses understanding of information and instructions, and is comfortable with plan to discharge. Pt is stable for discharge.        ED Medication(s):  Medications - No data to  display    New Prescriptions    No medications on file       Follow-up Information     Elier Ayoub Md, MD In 1 day.    Specialties:  Cardiology, Internal Medicine  Contact information:  [id number] [address] [facility]  [place] [address]  [phone]                   Current Discharge Medication List              Medical Decision Making:   Clinical Tests:   Lab Tests: Reviewed and Ordered  Radiological Study: Ordered and Reviewed  Medical Tests: Ordered and Reviewed     Additional MDM:   Smoking Cessation: The patient is a smoker. The patient was counseled on smoking cessation for: 5 minutes. The patient was counseled on tobacco related  health complications. The patient was counseled on the adverse effects of second hand smoke.          Scribe Attestation:   Scribe #1: I performed the above scribed service and the documentation accurately describes the services I performed. I attest to the accuracy of the note.     Attending:   Physician Attestation Statement for Scribe #1: I, Linden Lei MD, personally performed the services described in this documentation, as scribed by Nora Alas, in my presence, and it is both accurate and complete.       Scribe Attestation:   Scribe #2: I performed the above scribed service and the documentation accurately describes the services I performed. I attest to the accuracy of the note.    Attending Attestation:           Physician Attestation for Scribe:    Physician Attestation Statement for Scribe #2: I, Mina Zamarripa Jr., MD, reviewed documentation, as scribed by Marichuy Mckee in my presence, and it is both accurate and complete. I also acknowledge and confirm the content of the note done by Scribe #1.           Clinical Impression       ICD-10-CM ICD-9-CM   1. Anxiety F41.9 300.00   2. Chest pain R07.9 786.50       Disposition:   Disposition: Discharged  Condition: Stable         Mina Zamarripa Jr., MD  03/21/19 [id number]

## 2019-03-22 NOTE — DISCHARGE INSTRUCTIONS
StopSmoking.  Continue all the home medications.  Follow up with  tomorrow for re-evaluation.  Return as needed for any worsening symptoms, problems, questions or concerns.

## 2019-03-23 ENCOUNTER — PATIENT MESSAGE (OUTPATIENT)
Dept: PULMONOLOGY | Facility: CLINIC | Age: 49
End: 2019-03-23

## 2019-03-23 ENCOUNTER — TELEPHONE (OUTPATIENT)
Dept: PULMONOLOGY | Facility: CLINIC | Age: 49
End: 2019-03-23

## 2019-03-25 RX ORDER — HYDROXYZINE PAMOATE 25 MG/1
25 CAPSULE ORAL 3 TIMES DAILY PRN
Qty: 180 CAPSULE | Refills: 0 | Status: SHIPPED | OUTPATIENT
Start: 2019-03-25 | End: 2022-01-13

## 2019-03-25 RX ORDER — HYDROXYZINE PAMOATE 25 MG/1
25 CAPSULE ORAL 3 TIMES DAILY PRN
Qty: 180 CAPSULE | Refills: 0 | Status: SHIPPED | OUTPATIENT
Start: 2019-03-25 | End: 2019-03-25 | Stop reason: SDUPTHER

## 2019-03-26 ENCOUNTER — CLINICAL SUPPORT (OUTPATIENT)
Dept: SMOKING CESSATION | Facility: CLINIC | Age: 49
End: 2019-03-26
Payer: COMMERCIAL

## 2019-03-26 DIAGNOSIS — F17.210 LIGHT CIGARETTE SMOKER (1-9 CIGS/DAY): Primary | ICD-10-CM

## 2019-03-26 PROCEDURE — 99406 BEHAV CHNG SMOKING 3-10 MIN: CPT | Mod: S$GLB,,,

## 2019-03-26 PROCEDURE — 99406 PR TOBACCO USE CESSATION INTERMEDIATE 3-10 MINUTES: ICD-10-PCS | Mod: S$GLB,,,

## 2019-03-26 NOTE — PROGRESS NOTES
Patient canceled appt.. Called to see if going to do program. He was not sure if this was for him because he had cut back from 20 cigarettes to 7-8 per day on his own an without medication. He was offered the patch, gum , lozenges and Wellbutrin, but declined all. Discussed today whether he wanted to continue and felt this was not a good time with the stress in the complaint department is is having. He is still smoking the 7-8 cigarettes/day and has not mad e progress. He stated he wanted to wait to see if he needed to come back. Explained MD was glad he had looked into the program. He knows he has benefits and will call if he needs help.

## 2019-09-18 ENCOUNTER — CLINICAL SUPPORT (OUTPATIENT)
Dept: SMOKING CESSATION | Facility: CLINIC | Age: 49
End: 2019-09-18
Payer: COMMERCIAL

## 2019-09-18 DIAGNOSIS — F17.200 NICOTINE DEPENDENCE: Primary | ICD-10-CM

## 2019-09-18 PROCEDURE — 99407 PR TOBACCO USE CESSATION INTENSIVE >10 MINUTES: ICD-10-PCS | Mod: S$GLB,,,

## 2019-09-18 PROCEDURE — 99407 BEHAV CHNG SMOKING > 10 MIN: CPT | Mod: S$GLB,,,

## 2019-09-18 NOTE — PROGRESS NOTES
Called pt to f/u on his 3 and 6 month smoking cessation quit status. Pt stated he is still smoking. Informed him he has benefits available and is able to rejoin. Pt not interested, stated he is very busy with his job that he can't even make appointment to see his doctor. Informed him of benefit period, phone follow ups, and contact information. Will complete smart form and will call back in 6 months for 1 year quit status.

## 2019-11-25 DIAGNOSIS — E78.49 OTHER HYPERLIPIDEMIA: Primary | ICD-10-CM

## 2019-11-26 RX ORDER — ATORVASTATIN CALCIUM 40 MG/1
40 TABLET, FILM COATED ORAL DAILY
Qty: 30 TABLET | Refills: 1 | Status: SHIPPED | OUTPATIENT
Start: 2019-11-26 | End: 2019-12-23 | Stop reason: SDUPTHER

## 2019-11-29 RX ORDER — METOPROLOL TARTRATE 25 MG/1
25 TABLET, FILM COATED ORAL 2 TIMES DAILY
Qty: 60 TABLET | Refills: 0 | Status: SHIPPED | OUTPATIENT
Start: 2019-11-29 | End: 2019-12-23

## 2019-12-23 ENCOUNTER — OFFICE VISIT (OUTPATIENT)
Dept: CARDIOLOGY | Facility: CLINIC | Age: 49
End: 2019-12-23
Payer: COMMERCIAL

## 2019-12-23 VITALS
BODY MASS INDEX: 30.81 KG/M2 | SYSTOLIC BLOOD PRESSURE: 134 MMHG | WEIGHT: 190.94 LBS | HEART RATE: 74 BPM | OXYGEN SATURATION: 98 % | DIASTOLIC BLOOD PRESSURE: 80 MMHG

## 2019-12-23 DIAGNOSIS — R07.89 OTHER CHEST PAIN: ICD-10-CM

## 2019-12-23 DIAGNOSIS — R07.9 CHEST PAIN, UNSPECIFIED TYPE: ICD-10-CM

## 2019-12-23 DIAGNOSIS — Z72.0 TOBACCO ABUSE: ICD-10-CM

## 2019-12-23 DIAGNOSIS — R42 LIGHTHEADEDNESS: ICD-10-CM

## 2019-12-23 DIAGNOSIS — E78.49 OTHER HYPERLIPIDEMIA: ICD-10-CM

## 2019-12-23 DIAGNOSIS — G47.33 OSA (OBSTRUCTIVE SLEEP APNEA): ICD-10-CM

## 2019-12-23 DIAGNOSIS — R00.2 PALPITATIONS: Primary | ICD-10-CM

## 2019-12-23 PROCEDURE — 3079F PR MOST RECENT DIASTOLIC BLOOD PRESSURE 80-89 MM HG: ICD-10-PCS | Mod: CPTII,S$GLB,, | Performed by: INTERNAL MEDICINE

## 2019-12-23 PROCEDURE — 3075F SYST BP GE 130 - 139MM HG: CPT | Mod: CPTII,S$GLB,, | Performed by: INTERNAL MEDICINE

## 2019-12-23 PROCEDURE — 3008F PR BODY MASS INDEX (BMI) DOCUMENTED: ICD-10-PCS | Mod: CPTII,S$GLB,, | Performed by: INTERNAL MEDICINE

## 2019-12-23 PROCEDURE — 3008F BODY MASS INDEX DOCD: CPT | Mod: CPTII,S$GLB,, | Performed by: INTERNAL MEDICINE

## 2019-12-23 PROCEDURE — 99999 PR PBB SHADOW E&M-EST. PATIENT-LVL III: CPT | Mod: PBBFAC,,, | Performed by: INTERNAL MEDICINE

## 2019-12-23 PROCEDURE — 99999 PR PBB SHADOW E&M-EST. PATIENT-LVL III: ICD-10-PCS | Mod: PBBFAC,,, | Performed by: INTERNAL MEDICINE

## 2019-12-23 PROCEDURE — 3079F DIAST BP 80-89 MM HG: CPT | Mod: CPTII,S$GLB,, | Performed by: INTERNAL MEDICINE

## 2019-12-23 PROCEDURE — 3075F PR MOST RECENT SYSTOLIC BLOOD PRESS GE 130-139MM HG: ICD-10-PCS | Mod: CPTII,S$GLB,, | Performed by: INTERNAL MEDICINE

## 2019-12-23 PROCEDURE — 99214 PR OFFICE/OUTPT VISIT, EST, LEVL IV, 30-39 MIN: ICD-10-PCS | Mod: S$GLB,,, | Performed by: INTERNAL MEDICINE

## 2019-12-23 PROCEDURE — 99214 OFFICE O/P EST MOD 30 MIN: CPT | Mod: S$GLB,,, | Performed by: INTERNAL MEDICINE

## 2019-12-23 RX ORDER — ATORVASTATIN CALCIUM 40 MG/1
40 TABLET, FILM COATED ORAL DAILY
Qty: 30 TABLET | Refills: 3 | Status: SHIPPED | OUTPATIENT
Start: 2019-12-23 | End: 2022-01-13

## 2019-12-23 RX ORDER — PANTOPRAZOLE SODIUM 40 MG/1
40 TABLET, DELAYED RELEASE ORAL DAILY
Qty: 30 TABLET | Refills: 3 | Status: SHIPPED | OUTPATIENT
Start: 2019-12-23 | End: 2022-01-13

## 2019-12-23 NOTE — PROGRESS NOTES
"Subjective:   Patient ID:  Lv Joyce is a 49 y.o. male who presents for cardiac consult of Follow-up (3 month f/u)      Chest Pain    Associated symptoms include dizziness and palpitations. Pertinent negatives include no headaches.   Follow-up   Associated symptoms include chest pain. Pertinent negatives include no headaches.     The patient came in today for cardiac consult of Follow-up (3 month f/u)    Referring Physician: Yo Bradford MD   Reason for consult: Chest pain      Lv Joyce is a 49 y.o. male pt with current medical conditions non-obs CAD, tobacco abuse, GERD, HLD, CINDY presents for follow up CV evaluation.    11/1/18  He complains of chest pain described as numbness. Said something felt "funny". He cut back smoking a while ago but smokes a few cigs/day now.   Diet has improved. BP fluctuates. He currently feels a fuzzy feeling. Feels maybe numbness. 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 Maybe once had gasping for air at night. Does not snore lately, used to snore in the past.  If he is sitting down at lunch, he has to get up and move, maybe feels lightheaded. Has gained significant weight in past 5 years, less working out now. Had outside 2D ECHO with friend that said heart was normal. He has presented to [facility] ED twice last month for chest pain/discomfort. Neg CV workup - trops, ECGS.  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     [facility] non-obs CAD - see below    1/7/18  Pt has been feeling palpitations over the weekend. Symptoms have been getting worse lately, even with lifting weights felt strange and tired. Sat morning he smoked a cig and symptoms became worse. Palpitations lasted a few seconds. BP was ok. No chest pain but felt congestions, no cough/fevers. No caffeine. He had a sleep apnea eval told he does not need sleep study. He has stopped BB and Statin due to possible side effects " only taking asa and protonix. He has not had any GI workup for reflux.     12/23/19  Holter neg. He has been working on AC units now. He continues to have reflux/IBS sx with certain food - today chicken shwarma. BP occ feels like it is orthostatic.   CINDY eval - Findings are consistent with mild, positional obstructive sleep apnea (CINDY). Overall AHI was 6.0/hr with 6.5 hours sleep.     Patient feels no sob, no leg swelling, no PND,  no syncope, no CNS symptoms.    Patient has fairly good exercise tolerance.    Patient is compliant with medications.    FH - no CV disease; HTN in parents        TEST DESCRIPTION   PREDOMINANT RHYTHM  1. Sinus rhythm with heart rates varying between 53 and 114 bpm with an average of 75 bpm.   VENTRICULAR ARRHYTHMIAS  1. . there is no ventricular ectopy noted  2. There were no episodes of ventricular tachycardia.    SUPRA VENTRICULAR ARRHYTHMIAS  1. There were very rare PACs recorded totalling 10 and averaging less than 1 per hour.   2. There were no episodes of sustained supraventricular tachycardia.    [facility] 11/18  - Left Main Coronary Artery:             The LM is normal. There is TERRI 3 flow.     - Left Anterior Descending Artery:             The mid LAD has a 10% stenosis. There is TERRI 3 flow. The remaining portion of the vessel is normal.     - D1:             The D1 is normal. There is TERRI 3 flow.     - Left Circumflex Artery:             The LCX is normal. There is TERRI 3 flow.     - OM1:             The OM1 is normal. There is TERRI 3 flow.     - Right Coronary Artery:             The RCA is normal. There is TERRI 3 flow.    Past Medical History:   Diagnosis Date    Hyperlipidemia     Nonobstructive atherosclerosis of coronary artery 1/7/2019    CINDY (obstructive sleep apnea) 12/23/2019       Past Surgical History:   Procedure Laterality Date    CARDIAC CATHETERIZATION      LEFT HEART CATHETERIZATION Left 11/9/2018    Procedure: CATHETERIZATION, HEART, LEFT;  Surgeon: Waylon FRASER  MD Cole;  [facility];  Service: Cardiology;  Laterality: Left;       Social History     Tobacco Use    Smoking status: Current Every Day Smoker     Packs/day: 1.00     Years: 30.00     Pack years: 30.00     Types: Cigarettes, Vaping with nicotine     Start date: 1/1/1988    Smokeless tobacco: Never Used   Substance Use Topics    Alcohol use: No     Frequency: Never    Drug use: No       Family History   Problem Relation Age of Onset    Hyperlipidemia Mother     Hypertension Mother     Hyperlipidemia Father     No Known Problems Brother     No Known Problems Brother        Patient's Medications   New Prescriptions    No medications on file   Previous Medications    ASPIRIN (ECOTRIN) 81 MG EC TABLET    Take 81 mg by mouth once daily.    HYDROXYZINE PAMOATE (VISTARIL) 25 MG CAP    Take 1 capsule (25 mg total) by mouth 3 (three) times daily as needed.   Modified Medications    Modified Medication Previous Medication    ATORVASTATIN (LIPITOR) 40 MG TABLET atorvastatin (LIPITOR) 40 MG tablet       Take 1 tablet (40 mg total) by mouth once daily.    Take 1 tablet (40 mg total) by mouth once daily.    PANTOPRAZOLE (PROTONIX) 40 MG TABLET pantoprazole (PROTONIX) 40 MG tablet       Take 1 tablet (40 mg total) by mouth once daily.    Take 1 tablet (40 mg total) by mouth once daily.   Discontinued Medications    METOPROLOL TARTRATE (LOPRESSOR) 25 MG TABLET    Take 1 tablet (25 mg total) by mouth 2 (two) times daily.    NITROGLYCERIN (NITROSTAT) 0.4 MG SL TABLET    Place 1 tablet (0.4 mg total) under the tongue every 5 (five) minutes as needed for Chest pain.       Review of Systems   Constitutional: Negative.    HENT: Negative.    Eyes: Negative.    Respiratory: Negative.    Cardiovascular: Positive for chest pain and palpitations.   Gastrointestinal: Positive for heartburn.   Genitourinary: Negative.    Musculoskeletal: Negative.    Skin: Negative.    Neurological: Positive for dizziness. Negative for  sensory change, focal weakness, loss of consciousness and headaches.   Endo/Heme/Allergies: Negative.    Psychiatric/Behavioral: Negative.    All 12 systems otherwise negative.      Wt Readings from Last 3 Encounters:   12/23/19 86.6 kg (190 lb 14.7 oz)   03/21/19 85.4 kg (188 lb 4.4 oz)   03/04/19 86.1 kg (189 lb 14.8 oz)     Temp Readings from Last 3 Encounters:   03/21/19 97.9 °F (36.6 °C) (Oral)   01/13/19 98 °F (36.7 °C) (Oral)   11/19/18 97.3 °F (36.3 °C) (Tympanic)     BP Readings from Last 3 Encounters:   12/23/19 134/80   03/21/19 117/73   03/04/19 115/72     Pulse Readings from Last 3 Encounters:   12/23/19 74   03/21/19 65   03/04/19 80       /80 (BP Location: Right arm, Patient Position: Sitting, BP Method: Medium (Manual))   Pulse 74   Wt 86.6 kg (190 lb 14.7 oz)   SpO2 98%   BMI 30.81 kg/m²     Objective:   Physical Exam   Constitutional: He is oriented to person, place, and time. He appears well-developed and well-nourished. No distress.   HENT:   Head: Normocephalic and atraumatic.   Nose: Nose normal.   Mouth/Throat: Oropharynx is clear and moist.   Eyes: Conjunctivae and EOM are normal. No scleral icterus.   Neck: Normal range of motion. Neck supple. No JVD present. No thyromegaly present.   Cardiovascular: Normal rate, regular rhythm, S1 normal and S2 normal. Exam reveals no gallop, no S3, no S4 and no friction rub.   No murmur heard.  Pulmonary/Chest: Effort normal and breath sounds normal. No stridor. No respiratory distress. He has no wheezes. He has no rales. He exhibits no tenderness.   Abdominal: Soft. Bowel sounds are normal. He exhibits no distension and no mass. There is no tenderness. There is no rebound.   Genitourinary:   Genitourinary Comments: Deferred   Musculoskeletal: Normal range of motion. He exhibits no edema, tenderness or deformity.   Lymphadenopathy:     He has no cervical adenopathy.   Neurological: He is alert and oriented to person, place, and time. He exhibits  normal muscle tone. Coordination normal.   Skin: Skin is warm and dry. No rash noted. He is not diaphoretic. No erythema. No pallor.   Psychiatric: He has a normal mood and affect. His behavior is normal. Judgment and thought content normal.   Nursing note and vitals reviewed.      Lab Results   Component Value Date     03/21/2019    K 3.6 03/21/2019     03/21/2019    CO2 22 (L) 03/21/2019    BUN 17 03/21/2019    CREATININE 0.9 03/21/2019     (H) 03/21/2019    HGBA1C 5.7 (H) 10/20/2018    AST 18 03/21/2019    ALT 29 03/21/2019    ALBUMIN 4.1 03/21/2019    PROT 7.3 03/21/2019    BILITOT 0.9 03/21/2019    WBC 14.05 (H) 03/21/2019    HGB 14.3 03/21/2019    HCT 42.6 03/21/2019    MCV 84 03/21/2019     03/21/2019    TSH 1.487 11/01/2018    CHOL 202 (H) 10/20/2018    HDL 37 (L) 10/20/2018    LDLCALC 131.2 10/20/2018    TRIG 169 (H) 10/20/2018    BNP <10 03/21/2019     Assessment:      1. Palpitations    2. Other hyperlipidemia    3. Lightheadedness    4. Other chest pain    5. Tobacco abuse    6. Chest pain, unspecified type    7. CINDY (obstructive sleep apnea)        Plan:   1. Non-obs CAD  - overall stable no CP/SOB    2. Palpitations  - Holter - neg  - rec BB    3. HLD  - statin    4. Obesity  - needs weight loss    5. Tobacco use  - needs cessation     6. CINDY  - may need CPAP, f/u with pulm    Thank you for allowing me to participate in this patient's care. Please do not hesitate to contact me with any questions or concerns. Consult note has been forwarded to the referral physician.

## 2020-04-02 ENCOUNTER — CLINICAL SUPPORT (OUTPATIENT)
Dept: SMOKING CESSATION | Facility: CLINIC | Age: 50
End: 2020-04-02
Payer: COMMERCIAL

## 2020-04-02 DIAGNOSIS — F17.200 NICOTINE DEPENDENCE: Primary | ICD-10-CM

## 2020-04-02 PROCEDURE — 99407 PR TOBACCO USE CESSATION INTENSIVE >10 MINUTES: ICD-10-PCS | Mod: S$GLB,,, | Performed by: INTERNAL MEDICINE

## 2020-04-02 PROCEDURE — 99407 BEHAV CHNG SMOKING > 10 MIN: CPT | Mod: S$GLB,,, | Performed by: INTERNAL MEDICINE

## 2020-04-02 NOTE — PROGRESS NOTES
Spoke with patient today in regard to smoking cessation progress for 12 month telephone follow up, he states not tobacco free. Patient states no interest in returning to the program at this time, will call when ready. Informed patient of benefit period and contact information if any further help or support is needed. Will resolve episode and complete smart form for Quit attempt #1.

## 2020-10-06 ENCOUNTER — PATIENT MESSAGE (OUTPATIENT)
Dept: ADMINISTRATIVE | Facility: HOSPITAL | Age: 50
End: 2020-10-06

## 2021-03-25 ENCOUNTER — PATIENT MESSAGE (OUTPATIENT)
Dept: ADMINISTRATIVE | Facility: HOSPITAL | Age: 51
End: 2021-03-25

## 2021-04-28 ENCOUNTER — PATIENT MESSAGE (OUTPATIENT)
Dept: RESEARCH | Facility: HOSPITAL | Age: 51
End: 2021-04-28

## 2021-08-06 ENCOUNTER — PATIENT MESSAGE (OUTPATIENT)
Dept: ADMINISTRATIVE | Facility: HOSPITAL | Age: 51
End: 2021-08-06

## 2022-01-13 ENCOUNTER — OFFICE VISIT (OUTPATIENT)
Dept: INTERNAL MEDICINE | Facility: CLINIC | Age: 52
End: 2022-01-13
Payer: COMMERCIAL

## 2022-01-13 VITALS
BODY MASS INDEX: 31.82 KG/M2 | OXYGEN SATURATION: 97 % | TEMPERATURE: 98 F | HEART RATE: 101 BPM | DIASTOLIC BLOOD PRESSURE: 80 MMHG | SYSTOLIC BLOOD PRESSURE: 130 MMHG | WEIGHT: 198 LBS | HEIGHT: 66 IN

## 2022-01-13 DIAGNOSIS — F17.200 SMOKING: ICD-10-CM

## 2022-01-13 DIAGNOSIS — Z00.00 ANNUAL PHYSICAL EXAM: Primary | ICD-10-CM

## 2022-01-13 PROCEDURE — 3079F PR MOST RECENT DIASTOLIC BLOOD PRESSURE 80-89 MM HG: ICD-10-PCS | Mod: CPTII,S$GLB,, | Performed by: FAMILY MEDICINE

## 2022-01-13 PROCEDURE — 99396 PR PREVENTIVE VISIT,EST,40-64: ICD-10-PCS | Mod: S$GLB,,, | Performed by: FAMILY MEDICINE

## 2022-01-13 PROCEDURE — 99999 PR PBB SHADOW E&M-EST. PATIENT-LVL III: CPT | Mod: PBBFAC,,, | Performed by: FAMILY MEDICINE

## 2022-01-13 PROCEDURE — 3008F PR BODY MASS INDEX (BMI) DOCUMENTED: ICD-10-PCS | Mod: CPTII,S$GLB,, | Performed by: FAMILY MEDICINE

## 2022-01-13 PROCEDURE — 99999 PR PBB SHADOW E&M-EST. PATIENT-LVL III: ICD-10-PCS | Mod: PBBFAC,,, | Performed by: FAMILY MEDICINE

## 2022-01-13 PROCEDURE — 1159F PR MEDICATION LIST DOCUMENTED IN MEDICAL RECORD: ICD-10-PCS | Mod: CPTII,S$GLB,, | Performed by: FAMILY MEDICINE

## 2022-01-13 PROCEDURE — 99396 PREV VISIT EST AGE 40-64: CPT | Mod: S$GLB,,, | Performed by: FAMILY MEDICINE

## 2022-01-13 PROCEDURE — 3008F BODY MASS INDEX DOCD: CPT | Mod: CPTII,S$GLB,, | Performed by: FAMILY MEDICINE

## 2022-01-13 PROCEDURE — 3079F DIAST BP 80-89 MM HG: CPT | Mod: CPTII,S$GLB,, | Performed by: FAMILY MEDICINE

## 2022-01-13 PROCEDURE — 1159F MED LIST DOCD IN RCRD: CPT | Mod: CPTII,S$GLB,, | Performed by: FAMILY MEDICINE

## 2022-01-13 PROCEDURE — 3075F PR MOST RECENT SYSTOLIC BLOOD PRESS GE 130-139MM HG: ICD-10-PCS | Mod: CPTII,S$GLB,, | Performed by: FAMILY MEDICINE

## 2022-01-13 PROCEDURE — 3075F SYST BP GE 130 - 139MM HG: CPT | Mod: CPTII,S$GLB,, | Performed by: FAMILY MEDICINE

## 2022-01-13 RX ORDER — BUPROPION HYDROCHLORIDE 150 MG/1
150 TABLET, EXTENDED RELEASE ORAL 2 TIMES DAILY
Qty: 60 TABLET | Refills: 3 | Status: SHIPPED | OUTPATIENT
Start: 2022-01-13 | End: 2023-07-17

## 2022-01-13 RX ORDER — MECLIZINE HYDROCHLORIDE 25 MG/1
25 TABLET ORAL 3 TIMES DAILY PRN
COMMUNITY
Start: 2022-01-10

## 2022-01-13 NOTE — PROGRESS NOTES
Subjective:       Patient ID: Lv Joyce is a 51 y.o. male.    Chief Complaint: Annual Exam    HPI    Patient Active Problem List   Diagnosis    Other chest pain    Lightheadedness    Other hyperlipidemia    Tobacco abuse    Palpitations    Nonobstructive atherosclerosis of coronary artery    CINDY (obstructive sleep apnea)       Past Medical History:   Diagnosis Date    Hyperlipidemia     Nonobstructive atherosclerosis of coronary artery 1/7/2019    CINDY (obstructive sleep apnea) 12/23/2019       Past Surgical History:   Procedure Laterality Date    CARDIAC CATHETERIZATION      LEFT HEART CATHETERIZATION Left 11/9/2018    Procedure: CATHETERIZATION, HEART, LEFT;  Surgeon: Waylon Galvan MD;  Location: [facility] CATH LAB;  Service: Cardiology;  Laterality: Left;       Family History   Problem Relation Age of Onset    Hyperlipidemia Mother     Hypertension Mother     Hyperlipidemia Father     No Known Problems Brother     No Known Problems Brother        Social History     Tobacco Use   Smoking Status Current Every Day Smoker    Packs/day: 1.00    Years: 30.00    Pack years: 30.00    Types: Cigarettes, Vaping with nicotine    Start date: 1/1/1988   Smokeless Tobacco Never Used       Wt Readings from Last 5 Encounters:   01/13/22 89.8 kg (197 lb 15.6 oz)   12/23/19 86.6 kg (190 lb 14.7 oz)   03/21/19 85.4 kg (188 lb 4.4 oz)   03/04/19 86.1 kg (189 lb 14.8 oz)   01/13/19 86.1 kg (189 lb 13.1 oz)       For further HPI details, see assessment and plan.    Review of Systems   Constitutional: Negative for chills and fever.   Respiratory: Negative for shortness of breath.    Cardiovascular: Negative for chest pain.   Neurological: Negative for dizziness.       Objective:      Vitals:    01/13/22 0707   BP: 130/80   Pulse: 101   Temp: 98 °F (36.7 °C)       Physical Exam  Constitutional:       General: He is not in acute distress.     Appearance: Normal appearance. He is well-developed.   Neck:       Trachea: Trachea normal.   Cardiovascular:      Rate and Rhythm: Normal rate and regular rhythm.      Heart sounds: Normal heart sounds.   Pulmonary:      Effort: Pulmonary effort is normal. No respiratory distress.      Breath sounds: Normal breath sounds. No decreased breath sounds.   Abdominal:      Palpations: Abdomen is soft.   Musculoskeletal:      Cervical back: Full passive range of motion without pain.   Neurological:      Mental Status: He is alert and oriented to person, place, and time.   Psychiatric:         Speech: Speech normal.         Behavior: Behavior normal.         Thought Content: Thought content normal.         Assessment:       1. Annual physical exam    2. Smoking        Plan:   Annual physical exam    Smoking        Annual  Lipid  a1c  tsh    Recently cbc/cmp reviewed    GERD  PPI    smoking    CAD  '18 cath  10 LAD stenosis  Takes asa      Vertigo   Meclizine    Td vaccine  Declines other    Patient presents for his annual exam.  He recently had an emergency room visit for chest pain.  suspected gastroesophageal reflux disease.  Discussed the pathophysiology of gastroesophageal reflux disease.  He prefers to use the over-the-counter proton pump inhibitor.  Will continue with that for now.  Discussed smoking cessation and dietary improvement.  Patient is smoking for a long time.  This is concerning especially given he has some very mild coronary artery disease as per his stress test a few years ago.  I am prescribing Wellbutrin today as a smoking cessation to and emphasized that he needs to really try hard to quit.  Patient declined vaccinations.  Encouraged him to obtain the COVID-19 vaccine.  Will obtain lab work.  Plan a follow-up in roughly 6 months.  Discussed the importance of dietary modification.  Encouraged a Mediterranean diet.      This note was verbally dictated, please excuse any type errors.

## 2022-01-14 ENCOUNTER — TELEPHONE (OUTPATIENT)
Dept: INTERNAL MEDICINE | Facility: CLINIC | Age: 52
End: 2022-01-14
Payer: COMMERCIAL

## 2022-01-14 DIAGNOSIS — Z91.89 RISK OF MYOCARDIAL INFARCTION OR STROKE 7.5% OR GREATER IN NEXT 10 YEARS: Primary | ICD-10-CM

## 2022-01-14 RX ORDER — ATORVASTATIN CALCIUM 40 MG/1
40 TABLET, FILM COATED ORAL DAILY
Qty: 90 TABLET | Refills: 3 | Status: SHIPPED | OUTPATIENT
Start: 2022-01-14 | End: 2023-07-17 | Stop reason: SDUPTHER

## 2022-01-14 NOTE — TELEPHONE ENCOUNTER
----- Message from Yo Bradford MD sent at 1/14/2022 12:48 PM CST -----  Call. His cholesterol is too high . The 10-year ASCVD risk score (Jeremiah NICOLAS Jr., et al., 2013) is: 10.5%  I would recommend statin therapy - cholesterol meds  I am restarting his cholesterol med  I want a cmp in 3 month to check his liver  Please arrange.

## 2022-03-18 ENCOUNTER — PATIENT MESSAGE (OUTPATIENT)
Dept: ADMINISTRATIVE | Facility: HOSPITAL | Age: 52
End: 2022-03-18
Payer: COMMERCIAL

## 2022-10-31 ENCOUNTER — PATIENT MESSAGE (OUTPATIENT)
Dept: RESEARCH | Facility: HOSPITAL | Age: 52
End: 2022-10-31
Payer: COMMERCIAL

## 2023-07-07 ENCOUNTER — PATIENT MESSAGE (OUTPATIENT)
Dept: INFECTIOUS DISEASES | Facility: CLINIC | Age: 53
End: 2023-07-07
Payer: COMMERCIAL

## 2023-07-10 ENCOUNTER — HOSPITAL ENCOUNTER (EMERGENCY)
Facility: HOSPITAL | Age: 53
Discharge: HOME | End: 2023-07-10
Attending: EMERGENCY MEDICINE
Payer: COMMERCIAL

## 2023-07-10 VITALS
SYSTOLIC BLOOD PRESSURE: 139 MMHG | TEMPERATURE: 98 F | DIASTOLIC BLOOD PRESSURE: 79 MMHG | HEART RATE: 66 BPM | HEIGHT: 66 IN | BODY MASS INDEX: 31.41 KG/M2 | OXYGEN SATURATION: 99 % | WEIGHT: 195.44 LBS | RESPIRATION RATE: 16 BRPM

## 2023-07-10 DIAGNOSIS — I10 HYPERTENSION: ICD-10-CM

## 2023-07-10 LAB
ALBUMIN SERPL BCP-MCNC: 4.1 G/DL (ref 3.5–5.2)
ALP SERPL-CCNC: 65 U/L (ref 55–135)
ALT SERPL W/O P-5'-P-CCNC: 16 U/L (ref 10–44)
ANION GAP SERPL CALC-SCNC: 11 MMOL/L (ref 8–16)
AST SERPL-CCNC: 14 U/L (ref 10–40)
BASOPHILS # BLD AUTO: 0.07 K/UL (ref 0–0.2)
BASOPHILS NFR BLD: 0.8 % (ref 0–1.9)
BILIRUB SERPL-MCNC: 0.6 MG/DL (ref 0.1–1)
BILIRUB UR QL STRIP: NEGATIVE
BNP SERPL-MCNC: <10 PG/ML (ref 0–99)
BUN SERPL-MCNC: 9 MG/DL (ref 6–20)
CALCIUM SERPL-MCNC: 9.2 MG/DL (ref 8.7–10.5)
CHLORIDE SERPL-SCNC: 109 MMOL/L (ref 95–110)
CK SERPL-CCNC: 81 U/L (ref 20–200)
CLARITY UR: CLEAR
CO2 SERPL-SCNC: 20 MMOL/L (ref 23–29)
COLOR UR: COLORLESS
CREAT SERPL-MCNC: 0.8 MG/DL (ref 0.5–1.4)
DIFFERENTIAL METHOD: NORMAL
EOSINOPHIL # BLD AUTO: 0.2 K/UL (ref 0–0.5)
EOSINOPHIL NFR BLD: 2.2 % (ref 0–8)
ERYTHROCYTE [DISTWIDTH] IN BLOOD BY AUTOMATED COUNT: 13.4 % (ref 11.5–14.5)
EST. GFR  (NO RACE VARIABLE): >60 ML/MIN/1.73 M^2
GLUCOSE SERPL-MCNC: 90 MG/DL (ref 70–110)
GLUCOSE UR QL STRIP: NEGATIVE
HCT VFR BLD AUTO: 43.5 % (ref 40–54)
HCV AB SERPL QL IA: NEGATIVE
HEP C VIRUS HOLD SPECIMEN: NORMAL
HGB BLD-MCNC: 14.4 G/DL (ref 14–18)
HGB UR QL STRIP: NEGATIVE
HIV 1+2 AB+HIV1 P24 AG SERPL QL IA: NEGATIVE
IMM GRANULOCYTES # BLD AUTO: 0.03 K/UL (ref 0–0.04)
IMM GRANULOCYTES NFR BLD AUTO: 0.4 % (ref 0–0.5)
KETONES UR QL STRIP: NEGATIVE
LEUKOCYTE ESTERASE UR QL STRIP: NEGATIVE
LYMPHOCYTES # BLD AUTO: 2.8 K/UL (ref 1–4.8)
LYMPHOCYTES NFR BLD: 32.2 % (ref 18–48)
MCH RBC QN AUTO: 27.9 PG (ref 27–31)
MCHC RBC AUTO-ENTMCNC: 33.1 G/DL (ref 32–36)
MCV RBC AUTO: 84 FL (ref 82–98)
MONOCYTES # BLD AUTO: 0.5 K/UL (ref 0.3–1)
MONOCYTES NFR BLD: 5.5 % (ref 4–15)
NEUTROPHILS # BLD AUTO: 5 K/UL (ref 1.8–7.7)
NEUTROPHILS NFR BLD: 58.9 % (ref 38–73)
NITRITE UR QL STRIP: NEGATIVE
NRBC BLD-RTO: 0 /100 WBC
PH UR STRIP: 7 [PH] (ref 5–8)
PLATELET # BLD AUTO: 275 K/UL (ref 150–450)
PMV BLD AUTO: 9.3 FL (ref 9.2–12.9)
POTASSIUM SERPL-SCNC: 3.9 MMOL/L (ref 3.5–5.1)
PROT SERPL-MCNC: 7.4 G/DL (ref 6–8.4)
PROT UR QL STRIP: NEGATIVE
RBC # BLD AUTO: 5.17 M/UL (ref 4.6–6.2)
SODIUM SERPL-SCNC: 140 MMOL/L (ref 136–145)
SP GR UR STRIP: 1.01 (ref 1–1.03)
TROPONIN I SERPL DL<=0.01 NG/ML-MCNC: <0.006 NG/ML (ref 0–0.03)
URN SPEC COLLECT METH UR: ABNORMAL
UROBILINOGEN UR STRIP-ACNC: NEGATIVE EU/DL
WBC # BLD AUTO: 8.54 K/UL (ref 3.9–12.7)

## 2023-07-10 PROCEDURE — 93010 ELECTROCARDIOGRAM REPORT: CPT | Mod: ,,, | Performed by: INTERNAL MEDICINE

## 2023-07-10 PROCEDURE — 99285 EMERGENCY DEPT VISIT HI MDM: CPT | Mod: 25

## 2023-07-10 PROCEDURE — 83880 ASSAY OF NATRIURETIC PEPTIDE: CPT | Performed by: NURSE PRACTITIONER

## 2023-07-10 PROCEDURE — 87389 HIV-1 AG W/HIV-1&-2 AB AG IA: CPT | Performed by: EMERGENCY MEDICINE

## 2023-07-10 PROCEDURE — 85025 COMPLETE CBC W/AUTO DIFF WBC: CPT | Performed by: NURSE PRACTITIONER

## 2023-07-10 PROCEDURE — 84484 ASSAY OF TROPONIN QUANT: CPT | Performed by: NURSE PRACTITIONER

## 2023-07-10 PROCEDURE — 81003 URINALYSIS AUTO W/O SCOPE: CPT | Performed by: NURSE PRACTITIONER

## 2023-07-10 PROCEDURE — 93005 ELECTROCARDIOGRAM TRACING: CPT

## 2023-07-10 PROCEDURE — 80053 COMPREHEN METABOLIC PANEL: CPT | Performed by: NURSE PRACTITIONER

## 2023-07-10 PROCEDURE — 93010 EKG 12-LEAD: ICD-10-PCS | Mod: ,,, | Performed by: INTERNAL MEDICINE

## 2023-07-10 PROCEDURE — 86803 HEPATITIS C AB TEST: CPT | Performed by: EMERGENCY MEDICINE

## 2023-07-10 PROCEDURE — 82550 ASSAY OF CK (CPK): CPT | Performed by: NURSE PRACTITIONER

## 2023-07-10 NOTE — Clinical Note
"Lv Carpiotono Joyce was seen and treated in our emergency department on 7/10/2023.  He may return to work on 07/12/2023.       If you have any questions or concerns, please don't hesitate to call.      Waylon Monae NP"

## 2023-07-10 NOTE — ED PROVIDER NOTES
Encounter Date: 7/10/2023       History     Chief Complaint   Patient presents with    Hypertension     Pt c/o of intermittent HTN (140/98) at home. Denies CP, headache, blurred vision, or SOB. Reports neck stiffness.      Patient presents to ER with concerns for hypertension, onset over last week or so.  He reports blood pressure intermittently elevating to 140s/90s.  Associated symptoms include neck stiffness.  He does report history of hypertension, states he stopped taking his blood pressure medication approximately 2 years ago.  Does report poor dietary habits as well as a daily smoker.  Symptoms have been intermittent since onset.  He denies chest pain, shortness of breath, palpitations, visual changes, weakness, fatigue, abdominal pain, nausea, vomiting.    The history is provided by the patient.   Review of patient's allergies indicates:  No Known Allergies  Past Medical History:   Diagnosis Date    Hyperlipidemia     Nonobstructive atherosclerosis of coronary artery 1/7/2019    CINDY (obstructive sleep apnea) 12/23/2019     Past Surgical History:   Procedure Laterality Date    CARDIAC CATHETERIZATION      LEFT HEART CATHETERIZATION Left 11/9/2018    Procedure: CATHETERIZATION, HEART, LEFT;  Surgeon: Waylon Galvan MD;  Location: [facility] CATH LAB;  Service: Cardiology;  Laterality: Left;     Family History   Problem Relation Age of Onset    Hyperlipidemia Mother     Hypertension Mother     Hyperlipidemia Father     No Known Problems Brother     No Known Problems Brother      Social History     Tobacco Use    Smoking status: Every Day     Packs/day: 1.00     Years: 30.00     Pack years: 30.00     Types: Cigarettes, Vaping with nicotine     Start date: 1/1/1988    Smokeless tobacco: Never   Substance Use Topics    Alcohol use: No    Drug use: No     Review of Systems   Constitutional:  Negative for chills, fatigue and fever.   HENT:  Negative for ear pain and sore throat.    Eyes:  Negative for pain and visual  disturbance.   Respiratory:  Negative for cough and shortness of breath.    Cardiovascular:  Negative for chest pain and palpitations.   Gastrointestinal:  Negative for abdominal pain, nausea and vomiting.   Genitourinary:  Negative for dysuria.   Musculoskeletal:  Negative for back pain, myalgias and neck pain.   Skin:  Negative for rash.   Neurological:  Negative for dizziness, syncope, speech difficulty, weakness, numbness and headaches.     Physical Exam     Initial Vitals [07/10/23 1102]   BP Pulse Resp Temp SpO2   137/86 84 16 97.9 °F (36.6 °C) 99 %      MAP       --         Physical Exam    Nursing note and vitals reviewed.  Constitutional: He appears well-developed and well-nourished. He is not diaphoretic. He is cooperative.  Non-toxic appearance. He does not have a sickly appearance. He does not appear ill. No distress.   HENT:   Head: Normocephalic and atraumatic.   Right Ear: External ear normal.   Left Ear: External ear normal.   Nose: Nose normal.   Mouth/Throat: Oropharynx is clear and moist.   Eyes: Conjunctivae and EOM are normal. Pupils are equal, round, and reactive to light.   Neck: Neck supple.   Normal range of motion.  Cardiovascular:  Normal rate, regular rhythm and intact distal pulses.           Pulmonary/Chest: Breath sounds normal. No respiratory distress.   Abdominal: Abdomen is soft. There is no abdominal tenderness.   Musculoskeletal:         General: Normal range of motion.      Cervical back: Normal range of motion and neck supple.     Neurological: He is alert and oriented to person, place, and time. He has normal strength. No sensory deficit. GCS score is 15. GCS eye subscore is 4. GCS verbal subscore is 5. GCS motor subscore is 6.   No focal deficits   Skin: Skin is warm and dry. Capillary refill takes less than 2 seconds.       ED Course   Procedures  Labs Reviewed   COMPREHENSIVE METABOLIC PANEL - Abnormal; Notable for the following components:       Result Value    CO2 20 (*)      All other components within normal limits    Narrative:     Release to patient->Immediate   URINALYSIS, REFLEX TO URINE CULTURE - Abnormal; Notable for the following components:    Color, UA Colorless (*)     All other components within normal limits    Narrative:     Specimen Source->Urine   HIV 1 / 2 ANTIBODY    Narrative:     Release to patient->Immediate   HEPATITIS C ANTIBODY    Narrative:     Release to patient->Immediate   HEP C VIRUS HOLD SPECIMEN    Narrative:     Release to patient->Immediate   CBC W/ AUTO DIFFERENTIAL    Narrative:     Release to patient->Immediate   TROPONIN I    Narrative:     Release to patient->Immediate   CK    Narrative:     Release to patient->Immediate   B-TYPE NATRIURETIC PEPTIDE    Narrative:     Release to patient->Immediate     EKG Readings: (Independently Interpreted)   Initial Reading: No STEMI. Rhythm: Normal Sinus Rhythm. Heart Rate: 78 bpm.   ECG Results              EKG 12-lead (In process)  Result time 07/10/23 11:18:20      In process by Interface, Lab In [facility] (07/10/23 11:18:20)                   Narrative:    Test Reason : I10,    Vent. Rate : 078 BPM     Atrial Rate : 078 BPM     P-R Int : 134 ms          QRS Dur : 088 ms      QT Int : 372 ms       P-R-T Axes : 061 067 040 degrees     QTc Int : 424 ms    Normal sinus rhythm  Normal ECG  When compared with ECG of 21-MAR-2019 19:20,  No significant change was found    Referred By: [email or web address]   SELF           Confirmed By:                                   Imaging Results              X-Ray Chest PA And Lateral (Final result)  Result time 07/10/23 11:30:51      Final result by Prasad Samuels MD (07/10/23 11:30:51)                   Impression:      No acute finding in the chest.      Electronically signed by: Prasad Samuels  Date:    07/10/2023  Time:    11:30               Narrative:    EXAMINATION:  XR CHEST PA AND LATERAL    CLINICAL HISTORY:  Essential (primary) hypertension    TECHNIQUE:  PA and lateral  views of the chest were performed.    FINDINGS:  Comparison: March 21, 2019.    Mediastinal silhouette is within normal limits.  The lungs are clear.  No pneumothorax or pleural effusion.  No acute osseous finding.                                       Medications - No data to display               All results reviewed and discussed patient he verbalized understanding.  Vital signs are stable.  Patient denies any symptoms at this time.  Patient states he self discontinued blood pressure medication approximately 2 years ago.  Discussed outpatient follow-up with his PCP for continuation of blood pressure medication as previously prescribed.  Discussed healthy dietary habits and smoking cessation with patient.  Patient receptive.  Discussed signs and symptoms to return to ER.  Patient agrees with plan and voices no further concerns.  I discussed with patient that evaluation in the ED does not suggest any emergent or life threatening medical conditions requiring immediate intervention beyond what was provided in the ED, and I believe patient is safe for discharge. Regardless, an unremarkable evaluation in the ED does not preclude the development or presence of a serious of life threatening condition. As such, patient was instructed to return immediately for any worsening or change in current symptoms.              Clinical Impression:   Final diagnoses:  [I10] Hypertension        ED Disposition Condition    Discharge Stable          ED Prescriptions    None       Follow-up Information       Follow up With Specialties Details Why Contact Info    Yo Bradford MD Family Medicine In 1 day  [id number] [facility] [id number]  [phone]      [facility] - Emergency Dept. Emergency Medicine  As needed, If symptoms worsen [id number] [facility] [id number] [phone]             Waylon Monae NP  07/10/23 7714

## 2023-07-17 ENCOUNTER — OFFICE VISIT (OUTPATIENT)
Dept: INTERNAL MEDICINE | Facility: CLINIC | Age: 53
End: 2023-07-17
Payer: COMMERCIAL

## 2023-07-17 ENCOUNTER — LAB VISIT (OUTPATIENT)
Dept: LAB | Facility: HOSPITAL | Age: 53
End: 2023-07-17
Attending: FAMILY MEDICINE
Payer: COMMERCIAL

## 2023-07-17 VITALS
TEMPERATURE: 96 F | BODY MASS INDEX: 31.54 KG/M2 | DIASTOLIC BLOOD PRESSURE: 80 MMHG | OXYGEN SATURATION: 99 % | HEART RATE: 80 BPM | HEIGHT: 66 IN | SYSTOLIC BLOOD PRESSURE: 116 MMHG

## 2023-07-17 DIAGNOSIS — Z12.5 SCREENING FOR PROSTATE CANCER: ICD-10-CM

## 2023-07-17 DIAGNOSIS — Z91.89 RISK OF MYOCARDIAL INFARCTION OR STROKE 7.5% OR GREATER IN NEXT 10 YEARS: ICD-10-CM

## 2023-07-17 DIAGNOSIS — I25.10 CORONARY ARTERY DISEASE, UNSPECIFIED VESSEL OR LESION TYPE, UNSPECIFIED WHETHER ANGINA PRESENT, UNSPECIFIED WHETHER NATIVE OR TRANSPLANTED HEART: ICD-10-CM

## 2023-07-17 DIAGNOSIS — F17.200 SMOKING: ICD-10-CM

## 2023-07-17 DIAGNOSIS — R20.0 LEFT ARM NUMBNESS: ICD-10-CM

## 2023-07-17 DIAGNOSIS — R29.818 OTHER SYMPTOMS AND SIGNS INVOLVING THE NERVOUS SYSTEM: ICD-10-CM

## 2023-07-17 DIAGNOSIS — E78.49 OTHER HYPERLIPIDEMIA: ICD-10-CM

## 2023-07-17 DIAGNOSIS — Z91.89 RISK OF MYOCARDIAL INFARCTION OR STROKE 7.5% OR GREATER IN NEXT 10 YEARS: Primary | ICD-10-CM

## 2023-07-17 DIAGNOSIS — R42 VERTIGO: ICD-10-CM

## 2023-07-17 LAB
CHOLEST SERPL-MCNC: 200 MG/DL (ref 120–199)
CHOLEST/HDLC SERPL: 5.3 {RATIO} (ref 2–5)
COMPLEXED PSA SERPL-MCNC: 1.2 NG/ML (ref 0–4)
ESTIMATED AVG GLUCOSE: 108 MG/DL (ref 68–131)
HBA1C MFR BLD: 5.4 % (ref 4–5.6)
HDLC SERPL-MCNC: 38 MG/DL (ref 40–75)
HDLC SERPL: 19 % (ref 20–50)
LDLC SERPL CALC-MCNC: 135.2 MG/DL (ref 63–159)
NONHDLC SERPL-MCNC: 162 MG/DL
TRIGL SERPL-MCNC: 134 MG/DL (ref 30–150)
VIT B12 SERPL-MCNC: 366 PG/ML (ref 210–950)

## 2023-07-17 PROCEDURE — 3008F PR BODY MASS INDEX (BMI) DOCUMENTED: ICD-10-PCS | Mod: CPTII,S$GLB,, | Performed by: FAMILY MEDICINE

## 2023-07-17 PROCEDURE — 80061 LIPID PANEL: CPT | Performed by: FAMILY MEDICINE

## 2023-07-17 PROCEDURE — 36415 COLL VENOUS BLD VENIPUNCTURE: CPT | Performed by: FAMILY MEDICINE

## 2023-07-17 PROCEDURE — 83036 HEMOGLOBIN GLYCOSYLATED A1C: CPT | Performed by: FAMILY MEDICINE

## 2023-07-17 PROCEDURE — 82607 VITAMIN B-12: CPT | Performed by: FAMILY MEDICINE

## 2023-07-17 PROCEDURE — 99999 PR PBB SHADOW E&M-EST. PATIENT-LVL IV: CPT | Mod: PBBFAC,,, | Performed by: FAMILY MEDICINE

## 2023-07-17 PROCEDURE — 99999 PR PBB SHADOW E&M-EST. PATIENT-LVL IV: ICD-10-PCS | Mod: PBBFAC,,, | Performed by: FAMILY MEDICINE

## 2023-07-17 PROCEDURE — 3074F SYST BP LT 130 MM HG: CPT | Mod: CPTII,S$GLB,, | Performed by: FAMILY MEDICINE

## 2023-07-17 PROCEDURE — 1159F PR MEDICATION LIST DOCUMENTED IN MEDICAL RECORD: ICD-10-PCS | Mod: CPTII,S$GLB,, | Performed by: FAMILY MEDICINE

## 2023-07-17 PROCEDURE — 3074F PR MOST RECENT SYSTOLIC BLOOD PRESSURE < 130 MM HG: ICD-10-PCS | Mod: CPTII,S$GLB,, | Performed by: FAMILY MEDICINE

## 2023-07-17 PROCEDURE — 1159F MED LIST DOCD IN RCRD: CPT | Mod: CPTII,S$GLB,, | Performed by: FAMILY MEDICINE

## 2023-07-17 PROCEDURE — 84153 ASSAY OF PSA TOTAL: CPT | Performed by: FAMILY MEDICINE

## 2023-07-17 PROCEDURE — 3008F BODY MASS INDEX DOCD: CPT | Mod: CPTII,S$GLB,, | Performed by: FAMILY MEDICINE

## 2023-07-17 PROCEDURE — 3079F PR MOST RECENT DIASTOLIC BLOOD PRESSURE 80-89 MM HG: ICD-10-PCS | Mod: CPTII,S$GLB,, | Performed by: FAMILY MEDICINE

## 2023-07-17 PROCEDURE — 99214 OFFICE O/P EST MOD 30 MIN: CPT | Mod: S$GLB,,, | Performed by: FAMILY MEDICINE

## 2023-07-17 PROCEDURE — 99214 PR OFFICE/OUTPT VISIT, EST, LEVL IV, 30-39 MIN: ICD-10-PCS | Mod: S$GLB,,, | Performed by: FAMILY MEDICINE

## 2023-07-17 PROCEDURE — 3079F DIAST BP 80-89 MM HG: CPT | Mod: CPTII,S$GLB,, | Performed by: FAMILY MEDICINE

## 2023-07-17 RX ORDER — ATORVASTATIN CALCIUM 40 MG/1
40 TABLET, FILM COATED ORAL DAILY
Qty: 90 TABLET | Refills: 3 | Status: SHIPPED | OUTPATIENT
Start: 2023-07-17 | End: 2024-07-16

## 2023-07-17 NOTE — PROGRESS NOTES
Subjective:       Patient ID: Lv Joyce is a 53 y.o. male.    Chief Complaint: Blood Pressure Check and Numbness (Left arm)    HPI    Patient Active Problem List   Diagnosis    Other chest pain    Lightheadedness    Other hyperlipidemia    Tobacco abuse    Palpitations    Nonobstructive atherosclerosis of coronary artery    CINDY (obstructive sleep apnea)       Past Medical History:   Diagnosis Date    Hyperlipidemia     Nonobstructive atherosclerosis of coronary artery 1/7/2019    CINDY (obstructive sleep apnea) 12/23/2019       Past Surgical History:   Procedure Laterality Date    CARDIAC CATHETERIZATION      LEFT HEART CATHETERIZATION Left 11/9/2018    Procedure: CATHETERIZATION, HEART, LEFT;  Surgeon: Waylon Galvan MD;  Location: [facility] CATH LAB;  Service: Cardiology;  Laterality: Left;       Family History   Problem Relation Age of Onset    Hyperlipidemia Mother     Hypertension Mother     Hyperlipidemia Father     No Known Problems Brother     No Known Problems Brother        Social History     Tobacco Use   Smoking Status Every Day    Packs/day: 1.00    Years: 30.00    Pack years: 30.00    Types: Cigarettes, Vaping with nicotine    Start date: 1/1/1988   Smokeless Tobacco Never       Wt Readings from Last 5 Encounters:   07/10/23 88.6 kg (195 lb 7 oz)   01/13/22 89.8 kg (197 lb 15.6 oz)   12/23/19 86.6 kg (190 lb 14.7 oz)   03/21/19 85.4 kg (188 lb 4.4 oz)   03/04/19 86.1 kg (189 lb 14.8 oz)       For further HPI details, see assessment and plan.    Review of Systems   Constitutional:  Negative for activity change and unexpected weight change.   HENT:  Negative for hearing loss, rhinorrhea and trouble swallowing.    Eyes:  Negative for discharge and visual disturbance.   Respiratory:  Negative for chest tightness and wheezing.    Cardiovascular:  Positive for palpitations. Negative for chest pain.   Gastrointestinal:  Negative for blood in stool, constipation, diarrhea and vomiting.   Endocrine:  Negative for polydipsia and polyuria.   Genitourinary:  Negative for difficulty urinating, hematuria and urgency.   Musculoskeletal:  Positive for neck pain. Negative for arthralgias and joint swelling.   Neurological:  Positive for weakness. Negative for headaches.   Psychiatric/Behavioral:  Negative for confusion and dysphoric mood.      Objective:      Vitals:    07/17/23 1027   BP: 116/80   Pulse: 80   Temp: 96 °F (35.6 °C)       Physical Exam  Constitutional:       General: He is not in acute distress.     Appearance: Normal appearance. He is well-developed.   Neck:      Trachea: Trachea normal.   Cardiovascular:      Rate and Rhythm: Normal rate and regular rhythm.      Heart sounds: Normal heart sounds.   Pulmonary:      Effort: Pulmonary effort is normal. No respiratory distress.      Breath sounds: Normal breath sounds. No decreased breath sounds.   Abdominal:      Palpations: Abdomen is soft.   Musculoskeletal:      Cervical back: Full passive range of motion without pain.   Neurological:      General: No focal deficit present.      Mental Status: He is alert and oriented to person, place, and time.      Cranial Nerves: No cranial nerve deficit.      Motor: No weakness.      Gait: Gait normal.   Psychiatric:         Speech: Speech normal.         Behavior: Behavior normal.         Thought Content: Thought content normal.       Assessment:       1. Risk of myocardial infarction or stroke 7.5% or greater in next 10 years    2. Smoking    3. Screening for prostate cancer    4. Left arm numbness    5. Other hyperlipidemia    6. Coronary artery disease, unspecified vessel or lesion type, unspecified whether angina present, unspecified whether native or transplanted heart    7. Vertigo        Plan:   Risk of myocardial infarction or stroke 7.5% or greater in next 10 years  -     Hemoglobin A1C; Future; Expected date: 07/17/2023  -     Lipid Panel; Future; Expected date: 07/17/2023  -     Ambulatory  referral/consult to Cardiology; Future; Expected date: 07/24/2023    Smoking    Screening for prostate cancer  -     PSA, Screening; Future; Expected date: 07/17/2023    Left arm numbness  -     Vitamin B12; Future; Expected date: 07/17/2023    Other hyperlipidemia    Coronary artery disease, unspecified vessel or lesion type, unspecified whether angina present, unspecified whether native or transplanted heart    Vertigo    Other orders  -     atorvastatin (LIPITOR) 40 MG tablet; Take 1 tablet (40 mg total) by mouth once daily.  Dispense: 90 tablet; Refill: 3        Patient presents for follow-up     He recently went to the emergency department out of concern of hypertension and not feeling his normal self.      Additionally for the past month or so he is had some left upper extremity tingling.  He denies any weakness. Entire arm L and into his L chest.Left arm ness was intermittent - but has continued since. (Progressing in frequency)  He does not have any chest pain  He has no other neurologic symptoms.  He has no weakness in the extremity or any other extremity.  No facial asymmetry.  No drooling.  No cognitive changes.  No vision changes.  He is had no history of chronic neck pain. Does not suffer with a stick neck.  He has no issues exerting himself.    No pain or abnormal neck exam    I do worry about his vascular health - had minimal CAD 5 years ago on cath - untreated lipids and still smoking  - will ensure gets cards f/u  , although index of suspicion is low w normal troponin and EKG. Additionally the numbness goes all the way into his left chest.      Checking B12 - long term PPI could drop and account for nerve issue    Will get head CT given this new and progressing neurologic symptoms with his medical history.  No other abnormal neurologic symptoms or complaints.  Do not suspect peripheral vascular cause - no swelling and wrist pulse strong and no cold hand issues.    Consider nerve conduction studies -  but starting w/ cardiovascular work up and lab work and CT      HLD  Lab Results   Component Value Date    LDLCALC 144.4 01/13/2022   The 10-year ASCVD risk score (Kenny MADERA, et al., 2019) is: 9.7%    Values used to calculate the score:      Age: 53 years      Sex: Male      Is Non- : No      Diabetic: No      Tobacco smoker: Yes      Systolic Blood Pressure: 116 mmHg      Is BP treated: No      HDL Cholesterol: 39 mg/dL      Total Cholesterol: 214 mg/dL  No longer on statin  Advise he return to such.  Still on asa - continue - no bleeding issues    Smoking  Cessation needed  Wellbutrin listed - not used.  He opted out.  Consider chantix in future.    Vertigo  Uses meclizine  Seems to work.  Uses prn    GERD  Works well with his prilosec.    4-6 week f/u to check in.    This note was verbally dictated, please excuse any type errors.

## 2023-07-19 ENCOUNTER — OFFICE VISIT (OUTPATIENT)
Dept: CARDIOLOGY | Facility: CLINIC | Age: 53
End: 2023-07-19
Payer: COMMERCIAL

## 2023-07-19 VITALS
OXYGEN SATURATION: 99 % | HEIGHT: 66 IN | SYSTOLIC BLOOD PRESSURE: 138 MMHG | HEART RATE: 80 BPM | DIASTOLIC BLOOD PRESSURE: 84 MMHG | BODY MASS INDEX: 30.62 KG/M2 | WEIGHT: 190.5 LBS

## 2023-07-19 DIAGNOSIS — E78.49 OTHER HYPERLIPIDEMIA: ICD-10-CM

## 2023-07-19 DIAGNOSIS — I25.10 NONOBSTRUCTIVE ATHEROSCLEROSIS OF CORONARY ARTERY: ICD-10-CM

## 2023-07-19 DIAGNOSIS — Z91.89 RISK OF MYOCARDIAL INFARCTION OR STROKE 7.5% OR GREATER IN NEXT 10 YEARS: Primary | ICD-10-CM

## 2023-07-19 DIAGNOSIS — E66.9 OBESITY (BMI 30.0-34.9): ICD-10-CM

## 2023-07-19 DIAGNOSIS — Z72.0 TOBACCO ABUSE: ICD-10-CM

## 2023-07-19 PROCEDURE — 3008F PR BODY MASS INDEX (BMI) DOCUMENTED: ICD-10-PCS | Mod: CPTII,S$GLB,, | Performed by: STUDENT IN AN ORGANIZED HEALTH CARE EDUCATION/TRAINING PROGRAM

## 2023-07-19 PROCEDURE — 99204 OFFICE O/P NEW MOD 45 MIN: CPT | Mod: S$GLB,,, | Performed by: STUDENT IN AN ORGANIZED HEALTH CARE EDUCATION/TRAINING PROGRAM

## 2023-07-19 PROCEDURE — 3075F SYST BP GE 130 - 139MM HG: CPT | Mod: CPTII,S$GLB,, | Performed by: STUDENT IN AN ORGANIZED HEALTH CARE EDUCATION/TRAINING PROGRAM

## 2023-07-19 PROCEDURE — 3044F PR MOST RECENT HEMOGLOBIN A1C LEVEL <7.0%: ICD-10-PCS | Mod: CPTII,S$GLB,, | Performed by: STUDENT IN AN ORGANIZED HEALTH CARE EDUCATION/TRAINING PROGRAM

## 2023-07-19 PROCEDURE — 1159F PR MEDICATION LIST DOCUMENTED IN MEDICAL RECORD: ICD-10-PCS | Mod: CPTII,S$GLB,, | Performed by: STUDENT IN AN ORGANIZED HEALTH CARE EDUCATION/TRAINING PROGRAM

## 2023-07-19 PROCEDURE — 3008F BODY MASS INDEX DOCD: CPT | Mod: CPTII,S$GLB,, | Performed by: STUDENT IN AN ORGANIZED HEALTH CARE EDUCATION/TRAINING PROGRAM

## 2023-07-19 PROCEDURE — 3079F PR MOST RECENT DIASTOLIC BLOOD PRESSURE 80-89 MM HG: ICD-10-PCS | Mod: CPTII,S$GLB,, | Performed by: STUDENT IN AN ORGANIZED HEALTH CARE EDUCATION/TRAINING PROGRAM

## 2023-07-19 PROCEDURE — 99999 PR PBB SHADOW E&M-EST. PATIENT-LVL IV: ICD-10-PCS | Mod: PBBFAC,,, | Performed by: STUDENT IN AN ORGANIZED HEALTH CARE EDUCATION/TRAINING PROGRAM

## 2023-07-19 PROCEDURE — 3044F HG A1C LEVEL LT 7.0%: CPT | Mod: CPTII,S$GLB,, | Performed by: STUDENT IN AN ORGANIZED HEALTH CARE EDUCATION/TRAINING PROGRAM

## 2023-07-19 PROCEDURE — 99204 PR OFFICE/OUTPT VISIT, NEW, LEVL IV, 45-59 MIN: ICD-10-PCS | Mod: S$GLB,,, | Performed by: STUDENT IN AN ORGANIZED HEALTH CARE EDUCATION/TRAINING PROGRAM

## 2023-07-19 PROCEDURE — 3079F DIAST BP 80-89 MM HG: CPT | Mod: CPTII,S$GLB,, | Performed by: STUDENT IN AN ORGANIZED HEALTH CARE EDUCATION/TRAINING PROGRAM

## 2023-07-19 PROCEDURE — 99999 PR PBB SHADOW E&M-EST. PATIENT-LVL IV: CPT | Mod: PBBFAC,,, | Performed by: STUDENT IN AN ORGANIZED HEALTH CARE EDUCATION/TRAINING PROGRAM

## 2023-07-19 PROCEDURE — 3075F PR MOST RECENT SYSTOLIC BLOOD PRESS GE 130-139MM HG: ICD-10-PCS | Mod: CPTII,S$GLB,, | Performed by: STUDENT IN AN ORGANIZED HEALTH CARE EDUCATION/TRAINING PROGRAM

## 2023-07-19 PROCEDURE — 1159F MED LIST DOCD IN RCRD: CPT | Mod: CPTII,S$GLB,, | Performed by: STUDENT IN AN ORGANIZED HEALTH CARE EDUCATION/TRAINING PROGRAM

## 2023-07-19 NOTE — PROGRESS NOTES
"              Section of Cardiology                  Cardiac Clinic Note    Chief Complaint/Reason for consultation: risk factors for CAD      HPI:   Lv Joyce is a 53 y.o. male with h/o HLD, tobacco abuse, nonobs CAD who comes in to cardiology clinic as a referral by PCP Dr. Bradford for evaluation.        7/19/23  Comes in with symptoms of left sided sensations, "feels funny" (left arm, neck, flank) usually after eating  Symptoms relieved with prilosec  Similar to feelings he had in 2019, saw Dr. Ayoub in cardiology, stress test was abnormal  [facility] 2019 minimal CAD, nl EF  No associated symptoms  Has GERD  Cannot sleep flat   Has no issues with his activity, heavy lifting/up and down ladders   Has not been eating healthy for lunch with his new job, tends to eat at [employer]- "on the go"  Tries to eat vegetables   Drinks pot of coffee in AM and PM    Does not take any meds  No history of HTN  Denies CINDY, was told he does not have it   Denies fatigue throughout the day    Denies SOB, chest pain   1ppd day smoker     Family hx: no heart disease       EKG 7/10/23 NSR, no acute ST - T wave changes    ECHO  2018    CONCLUSIONS     1 - Normal left ventricular systolic function (EF 60-65%).     2 - Normal left ventricular diastolic function.     3 - No wall motion abnormalities.     4 - Normal right ventricular systolic function .     5 - Concentric remodeling.                STRESS TEST No results found for this or any previous visit.       [facility] Results for orders placed during the hospital encounter of 11/09/18    Cath lab procedure    Narrative  Date of Procedure:  11/09/2018    A. Indication/Pre-Operative Diagnosis    The patient is a 48 year old male that was referred for catheterization by Dr. Elier Ayoub for ACS (unstable angina) and abnormal CV function study (Intermediate risk).    B. Summary/Post-Operative Diagnosis    1.   Minimal CAD.  2.   Normal LVEF.    C. HPI    I have reviewed the history and " physical completed on 11/09/2018. The patient has been examined and I concur with the findings from 11/09/2018.    Patient history was obtained from the patient.    Counseling: The patient was counseled regarding the potential risks and benefits of this procedure, as well as possible alternative approaches to the problem and gave informed consent.    The ASA Score was Class II.    [facility] Risk Score for MACE is 1.40%. [facility] Risk Score for Death is 0.10%.    Height: 67 in.      Weight: 202 lbs.      BMI: 31.60 kg/m2    Laboratory data revealed:    11/09/2018 CREAT  0.8, GLU  128, HCT  41.1, HGB  13.9, K  3.8, NA  136, PLT  276, WBCIR  8.52, BUN  9        ACS Enzymes:    10/18/2018 TROP  <0.00  10/18/2018 TROP  <0.00  11/09/2018 CPK  109, TROP  <0.00          D. Hemodynamic Results    LVEDP (Pre): 15 mmHg  LVEDP (Post): 15 mmHg  Ejection Fraction: 65%  No significant gradient noted on pullback from LV  No significant MR noted    AIR REST:  AO: 122/74 (95)  BP: 130/78  ETCO2: 42  HR: 76  LV: 116/6  LVEDP: 15    RSP: 18  SPO2: 99    E. Angiographic Results    Diagnostic:    Patient has a right dominant coronary artery.    - Left Main Coronary Artery:  The LM is normal. There is TERRI 3 flow.  - Left Anterior Descending Artery:  The mid LAD has a 10% stenosis. There is TERRI 3 flow. The remaining portion of the vessel is normal.  - D1:  The D1 is normal. There is TERRI 3 flow.  - Left Circumflex Artery:  The LCX is normal. There is TERRI 3 flow.  - OM1:  The OM1 is normal. There is TERRI 3 flow.  - Right Coronary Artery:  The RCA is normal. There is TERRI 3 flow.    F. Details of Procedure    PROCEDURES PERFORMED: LHC, Left Ventriculogram and Coronary Angio    ANESTHESIA: Conscious sedation was achieved with 50 mcg of Fentanyl, 50 ml of Fentanyl and 4 mg of Versed. Local anesthesia was achieved with 20 Lidocaine 2%. Moderate conscious sedation was performed and cardiorespiratory functions were monitored the  entire  procedure by Chanel Padilla RN. Sedation began at 12:11 PM and concluded at 12:42 PM, totalling 30.6 minutes.    PRIMARY SURGEON: Waylon Galvan MD    COMPLICATIONS: There were no complications.    Medications given on sterile field: Lidocaine 2% (20 ) and Nitroglycerine 200mcg/ml (20 ml).    Medications given during procedure: Heparin Bag 1000 Units/500ml (3 bags), Versed (4 mg), Fentanyl (100 mcg), Heparin 1000 Units/ml (3000 units) and Verapamil (calan) 5mg/2ml (2 mg).    The patient was brought to the catheterization laboratory. Bilateral groin prepped and draped. Right radial prepped and draped. After local anesthesia, a Sheath Terumo 6fr Glidesheath was inserted into the right Radial artery.    AORTA    A Wire Wholey 035 X 300 was inserted into the Aorta.    LM    A Cath Radial Tig 4.0 5fr was inserted into the ostial LM. The Wire Wholey 035 X 300 was removed. All catheter exchanges performed over a wire. Angiography performed in multiple views in the left coronary arteries. The Cath Radial Tig 4.0 5fr was  removed.    RCA    A Cath Impulse 5fr Fr4 was inserted into the ostial RCA. Angiography performed in multiple views in the right coronary arteries. The Cath Impulse 5fr Fr4 was removed.    Left  VENTRICLE    A Cath 5fr Pigtail Angled was inserted into the left ventricle. Hemodynamics recorded in the left ventricle. Angiography performed in the left ventricle. Hemodynamics recorded in the left ventricle.    The Cath 5fr Pigtail Angled was removed. A Tr Band was inserted into the right Radial artery. The Sheath Terumo 6fr Glidesheath was removed. A Tr Band was applied to the right Radial artery. TR Bamd has 10 mLs of air. Total Omnipaque injected was 90.0  ml. Total Omnipaque used was 150.0 ml.      Fluoroscopy Time 3.8 minutes  Radiation Dose 489.8 mGy  Contrast Injected 90 ml Omnipaque  Contrast Used  150 ml Omnipaque        Procedure log documented by Raina Hall RN and verified by Waylon Galvan,  MD    ESTIMATED BLOOD LOSS is < 50 cc.    SPECIMEN: No specimen.    G. Recommendations    1. Routine post-cath care.  2. Maximize medical management.  3. Risk factor reductions.    I certify that I was present for catheter insertion, catheter manipulation, angiography, and angiographic interpretation of this patient.    This document has been electronically  SIGNED BY: Waylon Galvan MD On: 11/09/2018 12:47            ROS: All 10 systems reviewed. Please refer to the HPI for pertinent positives. All other systems negative.     Past Medical History  Past Medical History:   Diagnosis Date    Hyperlipidemia     Nonobstructive atherosclerosis of coronary artery 1/7/2019    CINDY (obstructive sleep apnea) 12/23/2019       Surgical History  Past Surgical History:   Procedure Laterality Date    CARDIAC CATHETERIZATION      LEFT HEART CATHETERIZATION Left 11/9/2018    Procedure: CATHETERIZATION, HEART, LEFT;  Surgeon: Waylon Galvan MD;  Location: [facility] CATH LAB;  Service: Cardiology;  Laterality: Left;          Allergies:   Review of patient's allergies indicates:  No Known Allergies    Social History:  Social History     Socioeconomic History    Marital status:    Tobacco Use    Smoking status: Every Day     Packs/day: 1.00     Years: 30.00     Pack years: 30.00     Types: Cigarettes, Vaping with nicotine     Start date: 1/1/1988    Smokeless tobacco: Never   Substance and Sexual Activity    Alcohol use: No    Drug use: No       Family History:  family history includes Hyperlipidemia in his father and mother; Hypertension in his mother; No Known Problems in his brother and brother.    Home Medications:  Current Outpatient Medications on File Prior to Visit   Medication Sig Dispense Refill    aspirin (ECOTRIN) 81 MG EC tablet Take 81 mg by mouth once daily.      meclizine (ANTIVERT) 25 mg tablet Take 25 mg by mouth 3 (three) times daily as needed.      omeprazole magnesium (PRILOSEC ORAL) Take by mouth.       "atorvastatin (LIPITOR) 40 MG tablet Take 1 tablet (40 mg total) by mouth once daily. 90 tablet 3     No current facility-administered medications on file prior to visit.       Physical exam:  /84 (BP Location: Left arm, Patient Position: Sitting, BP Method: Medium (Manual))   Pulse 80   Ht 5' 6" (1.676 m)   Wt 86.4 kg (190 lb 7.6 oz)   SpO2 99%   BMI 30.74 kg/m²         General: Pt is a 53 y.o. year old male who is AAOx3, in NAD, is pleasant, well nourished, looks stated age  HEENT: PERRL, EOMI, Oral mucosa pink & moist  CVS  No abnormal cardiac pulsations noted on inspection. JVP not raised. The apical impulse is normal on palpation, and is located in the left 5th intercostal space in the mid - clavicular line. No palpable thrills or abnormal pulsations noted. RR, S1 - S2 heard, no murmurs, rubs or gallops appreciated.   PUL : CTA B/L. No wheezes/crackles heard   ABD : BS +, soft. No tenderness elicited   LE : No C/C/E. Distal Pulses palpable B/L         LABS:    Chemistry:   Lab Results   Component Value Date     07/10/2023    K 3.9 07/10/2023     07/10/2023    CO2 20 (L) 07/10/2023    BUN 9 07/10/2023    CREATININE 0.8 07/10/2023    CALCIUM 9.2 07/10/2023     Cardiac Markers:   Lab Results   Component Value Date    TROPONINI <0.006 07/10/2023     Cardiac Markers (Last 3):   Lab Results   Component Value Date    TROPONINI <0.006 07/10/2023    TROPONINI 0.01 01/09/2022    TROPONINI <0.006 03/21/2019    TROPONINI <0.006 11/09/2018     CBC:   Lab Results   Component Value Date    WBC 8.54 07/10/2023    HGB 14.4 07/10/2023    HCT 43.5 07/10/2023    MCV 84 07/10/2023     07/10/2023     Lipids:   Lab Results   Component Value Date    CHOL 200 (H) 07/17/2023    TRIG 134 07/17/2023    HDL 38 (L) 07/17/2023     Coagulation: No results found for: PT, INR, APTT        Assessment    1. Risk of myocardial infarction or stroke 7.5% or greater in next 10 years    2. Other hyperlipidemia    3. " Nonobstructive atherosclerosis of coronary artery    4. Tobacco abuse    5. Obesity (BMI 30.0-34.9)         Plan:  Nonobstructive CAD  Atypical symptoms, similar to prior  [facility] 2019 with nonobstructive CAD   Recommend taking aspirin and statin    HLD   as of 7/23   Restart statin    Tobacco abuse   Smoking cessation encouraged    Obesity, Body mass index is 30.74 kg/m².   Low salt, low fat diet  Exercise as tolerated, at least 30 min daily     This note was prepared using voice recognition system and is likely to have sound alike errors that may have been overlooked even after proofreading.     I have reviewed all pertinent chart information.  Plans and recommendations have been formulated under my direct supervision. All questions answered and patient voiced understanding.   If symptoms persist go to the ED.    RTC in 1 year        Kateryna Martínez MD  Cardiology

## 2023-07-31 ENCOUNTER — HOSPITAL ENCOUNTER (OUTPATIENT)
Dept: RADIOLOGY | Facility: HOSPITAL | Age: 53
Discharge: HOME | End: 2023-07-31
Attending: FAMILY MEDICINE
Payer: COMMERCIAL

## 2023-07-31 DIAGNOSIS — R29.818 OTHER SYMPTOMS AND SIGNS INVOLVING THE NERVOUS SYSTEM: ICD-10-CM

## 2023-07-31 PROCEDURE — 70450 CT HEAD/BRAIN W/O DYE: CPT | Mod: 26,,, | Performed by: RADIOLOGY

## 2023-07-31 PROCEDURE — 70450 CT HEAD WITHOUT CONTRAST: ICD-10-PCS | Mod: 26,,, | Performed by: RADIOLOGY

## 2023-07-31 PROCEDURE — 70450 CT HEAD/BRAIN W/O DYE: CPT | Mod: TC

## 2023-08-28 ENCOUNTER — OFFICE VISIT (OUTPATIENT)
Dept: INTERNAL MEDICINE | Facility: CLINIC | Age: 53
End: 2023-08-28
Payer: COMMERCIAL

## 2023-08-28 VITALS
TEMPERATURE: 98 F | OXYGEN SATURATION: 98 % | HEIGHT: 66 IN | HEART RATE: 92 BPM | WEIGHT: 182.63 LBS | SYSTOLIC BLOOD PRESSURE: 116 MMHG | DIASTOLIC BLOOD PRESSURE: 64 MMHG | BODY MASS INDEX: 29.35 KG/M2

## 2023-08-28 DIAGNOSIS — Z23 IMMUNIZATION DUE: Primary | ICD-10-CM

## 2023-08-28 DIAGNOSIS — I67.89 CEREBRAL MICROVASCULAR DISEASE: ICD-10-CM

## 2023-08-28 DIAGNOSIS — R20.0 LEFT ARM NUMBNESS: ICD-10-CM

## 2023-08-28 DIAGNOSIS — E78.49 OTHER HYPERLIPIDEMIA: ICD-10-CM

## 2023-08-28 DIAGNOSIS — F17.200 SMOKING: ICD-10-CM

## 2023-08-28 DIAGNOSIS — I25.10 CORONARY ARTERY DISEASE, UNSPECIFIED VESSEL OR LESION TYPE, UNSPECIFIED WHETHER ANGINA PRESENT, UNSPECIFIED WHETHER NATIVE OR TRANSPLANTED HEART: ICD-10-CM

## 2023-08-28 DIAGNOSIS — I25.10 NONOBSTRUCTIVE ATHEROSCLEROSIS OF CORONARY ARTERY: ICD-10-CM

## 2023-08-28 DIAGNOSIS — Z12.11 SCREENING FOR COLON CANCER: ICD-10-CM

## 2023-08-28 PROCEDURE — 99214 OFFICE O/P EST MOD 30 MIN: CPT | Mod: 25,S$GLB,, | Performed by: FAMILY MEDICINE

## 2023-08-28 PROCEDURE — 3008F PR BODY MASS INDEX (BMI) DOCUMENTED: ICD-10-PCS | Mod: CPTII,S$GLB,, | Performed by: FAMILY MEDICINE

## 2023-08-28 PROCEDURE — 3044F PR MOST RECENT HEMOGLOBIN A1C LEVEL <7.0%: ICD-10-PCS | Mod: CPTII,S$GLB,, | Performed by: FAMILY MEDICINE

## 2023-08-28 PROCEDURE — 90471 TD VACCINE GREATER THAN OR EQUAL TO 7YO PRESERVATIVE FREE IM: ICD-10-PCS | Mod: S$GLB,,, | Performed by: FAMILY MEDICINE

## 2023-08-28 PROCEDURE — 3044F HG A1C LEVEL LT 7.0%: CPT | Mod: CPTII,S$GLB,, | Performed by: FAMILY MEDICINE

## 2023-08-28 PROCEDURE — 90714 TD VACCINE GREATER THAN OR EQUAL TO 7YO PRESERVATIVE FREE IM: ICD-10-PCS | Mod: S$GLB,,, | Performed by: FAMILY MEDICINE

## 2023-08-28 PROCEDURE — 3074F PR MOST RECENT SYSTOLIC BLOOD PRESSURE < 130 MM HG: ICD-10-PCS | Mod: CPTII,S$GLB,, | Performed by: FAMILY MEDICINE

## 2023-08-28 PROCEDURE — 99999 PR PBB SHADOW E&M-EST. PATIENT-LVL IV: ICD-10-PCS | Mod: PBBFAC,,, | Performed by: FAMILY MEDICINE

## 2023-08-28 PROCEDURE — 99214 PR OFFICE/OUTPT VISIT, EST, LEVL IV, 30-39 MIN: ICD-10-PCS | Mod: 25,S$GLB,, | Performed by: FAMILY MEDICINE

## 2023-08-28 PROCEDURE — 90714 TD VACC NO PRESV 7 YRS+ IM: CPT | Mod: S$GLB,,, | Performed by: FAMILY MEDICINE

## 2023-08-28 PROCEDURE — 3074F SYST BP LT 130 MM HG: CPT | Mod: CPTII,S$GLB,, | Performed by: FAMILY MEDICINE

## 2023-08-28 PROCEDURE — 1159F MED LIST DOCD IN RCRD: CPT | Mod: CPTII,S$GLB,, | Performed by: FAMILY MEDICINE

## 2023-08-28 PROCEDURE — 90471 IMMUNIZATION ADMIN: CPT | Mod: S$GLB,,, | Performed by: FAMILY MEDICINE

## 2023-08-28 PROCEDURE — 99999 PR PBB SHADOW E&M-EST. PATIENT-LVL IV: CPT | Mod: PBBFAC,,, | Performed by: FAMILY MEDICINE

## 2023-08-28 PROCEDURE — 3078F PR MOST RECENT DIASTOLIC BLOOD PRESSURE < 80 MM HG: ICD-10-PCS | Mod: CPTII,S$GLB,, | Performed by: FAMILY MEDICINE

## 2023-08-28 PROCEDURE — 1159F PR MEDICATION LIST DOCUMENTED IN MEDICAL RECORD: ICD-10-PCS | Mod: CPTII,S$GLB,, | Performed by: FAMILY MEDICINE

## 2023-08-28 PROCEDURE — 3008F BODY MASS INDEX DOCD: CPT | Mod: CPTII,S$GLB,, | Performed by: FAMILY MEDICINE

## 2023-08-28 PROCEDURE — 3078F DIAST BP <80 MM HG: CPT | Mod: CPTII,S$GLB,, | Performed by: FAMILY MEDICINE

## 2023-08-28 NOTE — PROGRESS NOTES
Subjective:       Patient ID: Lv Joyce is a 53 y.o. male.    Chief Complaint: Follow-up (lab)    Follow-up  Pertinent negatives include no chest pain, chills or fever.       53     Patient Active Problem List   Diagnosis    Other chest pain    Lightheadedness    Other hyperlipidemia    Tobacco abuse    Palpitations    Nonobstructive atherosclerosis of coronary artery       Past Medical History:   Diagnosis Date    Hyperlipidemia     Nonobstructive atherosclerosis of coronary artery 1/7/2019    CINDY (obstructive sleep apnea) 12/23/2019       Past Surgical History:   Procedure Laterality Date    CARDIAC CATHETERIZATION      LEFT HEART CATHETERIZATION Left 11/9/2018    Procedure: CATHETERIZATION, HEART, LEFT;  Surgeon: Waylon Galvan MD;  Location: [facility] CATH LAB;  Service: Cardiology;  Laterality: Left;       Family History   Problem Relation Age of Onset    Hyperlipidemia Mother     Hypertension Mother     Hyperlipidemia Father     No Known Problems Brother     No Known Problems Brother        Social History     Tobacco Use   Smoking Status Every Day    Current packs/day: 1.00    Average packs/day: 1 pack/day for 35.7 years (35.7 ttl pk-yrs)    Types: Cigarettes, Vaping with nicotine    Start date: 1/1/1988   Smokeless Tobacco Never       Wt Readings from Last 5 Encounters:   08/28/23 82.9 kg (182 lb 10.4 oz)   07/19/23 86.4 kg (190 lb 7.6 oz)   07/10/23 88.6 kg (195 lb 7 oz)   01/13/22 89.8 kg (197 lb 15.6 oz)   12/23/19 86.6 kg (190 lb 14.7 oz)       For further HPI details, see assessment and plan.    Review of Systems   Constitutional:  Negative for chills and fever.   HENT:  Negative for trouble swallowing.    Respiratory:  Negative for shortness of breath.    Cardiovascular:  Negative for chest pain and palpitations.   Neurological:  Negative for dizziness and light-headedness.   Psychiatric/Behavioral:  Negative for confusion.        Objective:      Vitals:    08/28/23 0741   BP: 116/64   Pulse:  92   Temp: 98 °F (36.7 °C)       Physical Exam  Constitutional:       General: He is not in acute distress.     Appearance: He is not ill-appearing.   Pulmonary:      Effort: Pulmonary effort is normal. No respiratory distress.   Neurological:      General: No focal deficit present.      Mental Status: He is alert.   Psychiatric:         Mood and Affect: Mood normal.         Behavior: Behavior normal.         Assessment:       1. Immunization due    2. Screening for colon cancer    3. Smoking    4. Coronary artery disease, unspecified vessel or lesion type, unspecified whether angina present, unspecified whether native or transplanted heart    5. Other hyperlipidemia    6. Nonobstructive atherosclerosis of coronary artery    7. Left arm numbness        Plan:     Left Arm Numbess  At our last visit there was a concerning report of a abnormal neurologic symptom. Left Arm tingling.  Since then issue has resolved. However, over note -this morning for the 1st time since last encounter he has experienced some of the abnormal sensation in his left arm.  Seems hard to describe.  No chest pain.  No shortness of breath.  No weakness.  Just a numbness sensation in the left arm. No neck pain. Investigations not indicative of any significant pathology although his CT scan did show chronic microvascular changes.    To address this (along with other vascular concerns and HLD) I feel he needs to optimize his vascular risk factors which includes taking his statin and smoking cessation.    He does have nonobstructive coronary artery disease and cardiology gave him the same recommendation.  Can review most recent encounter.    Advise he monitor this numbness.  If anything progresses or changes I do feel further investigation is warranted.  We will likely consult Neurology.      Given his known coronary artery disease and his risk factors if he begins to experience any chest pain or trouble breathing or dizziness or abnormal sweating  advise he present to the emergency department  Seems unlikely given he has a significantly physical job and has been active in the heat with no limitation or symptoms.    Hyperlipidemia and coronary artery disease  He started taking his statin, 1st dose was yesterday.  Encouraged he continue such    Smoking  He is making significant progress in his cessation.  Making reductions.  Knows he absolutely needs to quit smoking.    Immunizations discussed  He is due for a number of vaccines.  He does agree to do a tetanus shot as he does work around a lot of metal.  We will arrange for that today    Discussed colon cancer screening.  He is interested in home investigations.  Will order the Cologuard.  Advise he expected in the mail.    Patient has been smoking since age 18 and estimates roughly a pack a day and that time.  Well over 20 pack year history.  Discussed annual low-dose CT screening.  Will obtain such and start annual checks    Gastroesophageal reflux disease  Patient uses the occasional Prilosec.  He recently had an episode in which after a meal with pizza significant burning in his chest.  Discussed dietary modification    6 month or PRN    This note was verbally dictated, please excuse any type errors.

## 2023-09-11 ENCOUNTER — HOSPITAL ENCOUNTER (OUTPATIENT)
Dept: RADIOLOGY | Facility: HOSPITAL | Age: 53
Discharge: HOME | End: 2023-09-11
Attending: FAMILY MEDICINE
Payer: COMMERCIAL

## 2023-09-11 DIAGNOSIS — F17.200 SMOKING: ICD-10-CM

## 2023-09-11 PROCEDURE — 71271 CT THORAX LUNG CANCER SCR C-: CPT | Mod: 26,,, | Performed by: RADIOLOGY

## 2023-09-11 PROCEDURE — 71271 CT CHEST LUNG SCREENING LOW DOSE: ICD-10-PCS | Mod: 26,,, | Performed by: RADIOLOGY

## 2023-09-11 PROCEDURE — 71271 CT THORAX LUNG CANCER SCR C-: CPT | Mod: TC

## 2023-09-15 LAB — NONINV COLON CA DNA+OCC BLD SCRN STL QL: NEGATIVE

## 2024-05-17 ENCOUNTER — PATIENT MESSAGE (OUTPATIENT)
Dept: RESEARCH | Facility: HOSPITAL | Age: 54
End: 2024-05-17
Payer: COMMERCIAL

## 2024-07-09 ENCOUNTER — HOSPITAL ENCOUNTER (EMERGENCY)
Facility: HOSPITAL | Age: 54
Discharge: HOME | End: 2024-07-09
Attending: EMERGENCY MEDICINE
Payer: COMMERCIAL

## 2024-07-09 VITALS
BODY MASS INDEX: 30.86 KG/M2 | WEIGHT: 191.19 LBS | RESPIRATION RATE: 17 BRPM | DIASTOLIC BLOOD PRESSURE: 80 MMHG | OXYGEN SATURATION: 97 % | SYSTOLIC BLOOD PRESSURE: 145 MMHG | TEMPERATURE: 98 F | HEART RATE: 68 BPM

## 2024-07-09 DIAGNOSIS — R07.9 CHEST PAIN: ICD-10-CM

## 2024-07-09 LAB
ALBUMIN SERPL BCP-MCNC: 4.1 G/DL (ref 3.5–5.2)
ALP SERPL-CCNC: 75 U/L (ref 55–135)
ALT SERPL W/O P-5'-P-CCNC: 23 U/L (ref 10–44)
ANION GAP SERPL CALC-SCNC: 9 MMOL/L (ref 8–16)
AST SERPL-CCNC: 17 U/L (ref 10–40)
BASOPHILS # BLD AUTO: 0.06 K/UL (ref 0–0.2)
BASOPHILS NFR BLD: 0.6 % (ref 0–1.9)
BILIRUB SERPL-MCNC: 0.5 MG/DL (ref 0.1–1)
BUN SERPL-MCNC: 11 MG/DL (ref 6–20)
CALCIUM SERPL-MCNC: 9.7 MG/DL (ref 8.7–10.5)
CHLORIDE SERPL-SCNC: 109 MMOL/L (ref 95–110)
CO2 SERPL-SCNC: 20 MMOL/L (ref 23–29)
CREAT SERPL-MCNC: 0.9 MG/DL (ref 0.5–1.4)
DIFFERENTIAL METHOD BLD: NORMAL
EOSINOPHIL # BLD AUTO: 0.4 K/UL (ref 0–0.5)
EOSINOPHIL NFR BLD: 4 % (ref 0–8)
ERYTHROCYTE [DISTWIDTH] IN BLOOD BY AUTOMATED COUNT: 13.2 % (ref 11.5–14.5)
EST. GFR  (NO RACE VARIABLE): >60 ML/MIN/1.73 M^2
GLUCOSE SERPL-MCNC: 83 MG/DL (ref 70–110)
HCT VFR BLD AUTO: 43.5 % (ref 40–54)
HGB BLD-MCNC: 14.8 G/DL (ref 14–18)
IMM GRANULOCYTES # BLD AUTO: 0.04 K/UL (ref 0–0.04)
IMM GRANULOCYTES NFR BLD AUTO: 0.4 % (ref 0–0.5)
LYMPHOCYTES # BLD AUTO: 3.4 K/UL (ref 1–4.8)
LYMPHOCYTES NFR BLD: 35.6 % (ref 18–48)
MCH RBC QN AUTO: 29 PG (ref 27–31)
MCHC RBC AUTO-ENTMCNC: 34 G/DL (ref 32–36)
MCV RBC AUTO: 85 FL (ref 82–98)
MONOCYTES # BLD AUTO: 0.8 K/UL (ref 0.3–1)
MONOCYTES NFR BLD: 8 % (ref 4–15)
NEUTROPHILS # BLD AUTO: 4.8 K/UL (ref 1.8–7.7)
NEUTROPHILS NFR BLD: 51.4 % (ref 38–73)
NRBC BLD-RTO: 0 /100 WBC
PLATELET # BLD AUTO: 251 K/UL (ref 150–450)
PMV BLD AUTO: 9.6 FL (ref 9.2–12.9)
POTASSIUM SERPL-SCNC: 4.3 MMOL/L (ref 3.5–5.1)
PROT SERPL-MCNC: 7.5 G/DL (ref 6–8.4)
RBC # BLD AUTO: 5.1 M/UL (ref 4.6–6.2)
SODIUM SERPL-SCNC: 138 MMOL/L (ref 136–145)
TROPONIN I SERPL DL<=0.01 NG/ML-MCNC: <0.006 NG/ML (ref 0–0.03)
WBC # BLD AUTO: 9.4 K/UL (ref 3.9–12.7)

## 2024-07-09 PROCEDURE — 84484 ASSAY OF TROPONIN QUANT: CPT | Performed by: PHYSICIAN ASSISTANT

## 2024-07-09 PROCEDURE — 93005 ELECTROCARDIOGRAM TRACING: CPT

## 2024-07-09 PROCEDURE — 85025 COMPLETE CBC W/AUTO DIFF WBC: CPT | Performed by: PHYSICIAN ASSISTANT

## 2024-07-09 PROCEDURE — 93010 ELECTROCARDIOGRAM REPORT: CPT | Mod: ,,, | Performed by: INTERNAL MEDICINE

## 2024-07-09 PROCEDURE — 80053 COMPREHEN METABOLIC PANEL: CPT | Performed by: PHYSICIAN ASSISTANT

## 2024-07-09 PROCEDURE — 99285 EMERGENCY DEPT VISIT HI MDM: CPT | Mod: 25

## 2024-07-09 NOTE — FIRST PROVIDER EVALUATION
Medical screening examination initiated.  I have conducted a focused provider triage encounter, findings are as follows:    Brief history of present illness:  Strange tingling-like sensation to left chest for several days.  Denies sob or true pain    Vitals:    07/09/24 1413 07/09/24 1415 07/09/24 1416   BP:   138/77   Pulse:  77    Resp:  16    Temp:  98.2 °F (36.8 °C)    SpO2:  98%    Weight: 86.7 kg (191 lb 3.2 oz)         Pertinent physical exam:  nad, alert    Brief workup plan:  cardiac rule out      Preliminary workup initiated; this workup will be continued and followed by the physician or advanced practice provider that is assigned to the patient when roomed.

## 2024-07-10 NOTE — ED PROVIDER NOTES
"SCRIBE #1 NOTE: I, Medina Quang, am scribing for, and in the presence of, Krista Alas MD. I have scribed the entire note.       History     Chief Complaint   Patient presents with    Generalized Body Aches     Pt c/o of above the waist "funny feelings in his body on the left side." Left eye twitching. Hx heart cath. -NV      Review of patient's allergies indicates:  No Known Allergies      History of Present Illness     HPI    7/9/2024, 8:32 PM  History obtained from the patient      History of Present Illness: Lv Joyce is a 54 y.o. male patient with a PMHx of HLD and nonobstructive atherosclerosis of coronary artery who presents to the Emergency Department for evaluation of intermittent, moderate generalized myalgia which onset several weeks PTA. The patient additionally reports intermittent abdominal discomfort. He notably denies abdominal pain, diarrhea, dysuria, hematochezia. No mitigating or exacerbating factors reported. No prior Tx reported. The patient adds that he smokes 1 ppd and drinks 2 pots of coffee every morning. He is an AC technician. No further complaints or concerns at this time.       Arrival mode: Personal vehicle    PCP: Yo Bradford MD        Past Medical History:  Past Medical History:   Diagnosis Date    Hyperlipidemia     Nonobstructive atherosclerosis of coronary artery 1/7/2019    CINDY (obstructive sleep apnea) 12/23/2019       Past Surgical History:  Past Surgical History:   Procedure Laterality Date    CARDIAC CATHETERIZATION      LEFT HEART CATHETERIZATION Left 11/9/2018    Procedure: CATHETERIZATION, HEART, LEFT;  Surgeon: Waylon Galvan MD;  Location: [facility] CATH LAB;  Service: Cardiology;  Laterality: Left;         Family History:  Family History   Problem Relation Name Age of Onset    Hyperlipidemia Mother      Hypertension Mother      Hyperlipidemia Father      No Known Problems Brother      No Known Problems Brother         Social History:  Social History "     Tobacco Use    Smoking status: Every Day     Current packs/day: 1.00     Average packs/day: 1 pack/day for 36.5 years (36.5 ttl pk-yrs)     Types: Cigarettes, Vaping with nicotine     Start date: 1/1/1988    Smokeless tobacco: Never   Substance and Sexual Activity    Alcohol use: No    Drug use: No    Sexual activity: Not on file        Review of Systems     Review of Systems   Constitutional:  Negative for fever.   HENT:  Negative for sore throat.    Respiratory:  Negative for shortness of breath.    Cardiovascular:  Negative for chest pain.   Gastrointestinal:  Negative for abdominal pain, blood in stool, diarrhea and nausea.   Genitourinary:  Negative for dysuria.   Musculoskeletal:  Positive for myalgias (generalized). Negative for back pain.   Skin:  Negative for rash.   Neurological:  Negative for weakness.   Hematological:  Does not bruise/bleed easily.   All other systems reviewed and are negative.     Physical Exam     Initial Vitals   BP Pulse Resp Temp SpO2   07/09/24 1416 07/09/24 1415 07/09/24 1415 07/09/24 1415 07/09/24 1415   138/77 77 16 98.2 °F (36.8 °C) 98 %      MAP       --                 Physical Exam  Nursing Notes and Vital Signs Reviewed.  Constitutional: Patient is in no acute distress. Well-developed and well-nourished.  Head: Atraumatic. Normocephalic.  Eyes: PERRL. EOM intact. Conjunctivae are not pale. No scleral icterus.  ENT: Mucous membranes are moist. Oropharynx is clear and symmetric.    Neck: Supple. Full ROM. No lymphadenopathy.  Cardiovascular: Regular rate. Regular rhythm. No murmurs, rubs, or gallops. Distal pulses are 2+ and symmetric.  Pulmonary/Chest: No respiratory distress. Clear to auscultation bilaterally. No wheezing or rales.  Abdominal: Soft and non-distended.  There is no tenderness.  No rebound, guarding, or rigidity. Good bowel sounds.  Genitourinary: No CVA tenderness  Musculoskeletal: Moves all extremities. No obvious deformities. No edema. No calf  tenderness.  Skin: Warm and dry.  Neurological:  Alert, awake, and appropriate.  Normal speech.  No acute focal neurological deficits are appreciated.  Psychiatric: Normal affect. Good eye contact. Appropriate in content.     ED Course   Procedures  ED Vital Signs:  Vitals:    07/09/24 1413 07/09/24 1415 07/09/24 1416 07/09/24 1830   BP:   138/77 (!) 148/86   Pulse:  77  67   Resp:  16  15   Temp:  98.2 °F (36.8 °C)     SpO2:  98%  99%   Weight: 86.7 kg (191 lb 3.2 oz)       07/09/24 1901 07/09/24 1902 07/09/24 2030   BP:  (!) 137/90 (!) 145/80   Pulse: 67 66 68   Resp:  15 17   Temp:      SpO2:  99% 97%   Weight:          Abnormal Lab Results:  Labs Reviewed   COMPREHENSIVE METABOLIC PANEL - Abnormal; Notable for the following components:       Result Value    CO2 20 (*)     All other components within normal limits   CBC W/ AUTO DIFFERENTIAL   TROPONIN I        All Lab Results:  Results for orders placed or performed during the hospital encounter of 07/09/24   CBC auto differential   Result Value Ref Range    WBC 9.40 3.90 - 12.70 K/uL    RBC 5.10 4.60 - 6.20 M/uL    Hemoglobin 14.8 14.0 - 18.0 g/dL    Hematocrit 43.5 40.0 - 54.0 %    MCV 85 82 - 98 fL    MCH 29.0 27.0 - 31.0 pg    MCHC 34.0 32.0 - 36.0 g/dL    RDW 13.2 11.5 - 14.5 %    Platelets 251 150 - 450 K/uL    MPV 9.6 9.2 - 12.9 fL    Immature Granulocytes 0.4 0.0 - 0.5 %    Gran # (ANC) 4.8 1.8 - 7.7 K/uL    Immature Grans (Abs) 0.04 0.00 - 0.04 K/uL    Lymph # 3.4 1.0 - 4.8 K/uL    Mono # 0.8 0.3 - 1.0 K/uL    Eos # 0.4 0.0 - 0.5 K/uL    Baso # 0.06 0.00 - 0.20 K/uL    nRBC 0 0 /100 WBC    Gran % 51.4 38.0 - 73.0 %    Lymph % 35.6 18.0 - 48.0 %    Mono % 8.0 4.0 - 15.0 %    Eosinophil % 4.0 0.0 - 8.0 %    Basophil % 0.6 0.0 - 1.9 %    Differential Method Automated    Comprehensive metabolic panel   Result Value Ref Range    Sodium 138 136 - 145 mmol/L    Potassium 4.3 3.5 - 5.1 mmol/L    Chloride 109 95 - 110 mmol/L    CO2 20 (L) 23 - 29 mmol/L     Glucose 83 70 - 110 mg/dL    BUN 11 6 - 20 mg/dL    Creatinine 0.9 0.5 - 1.4 mg/dL    Calcium 9.7 8.7 - 10.5 mg/dL    Total Protein 7.5 6.0 - 8.4 g/dL    Albumin 4.1 3.5 - 5.2 g/dL    Total Bilirubin 0.5 0.1 - 1.0 mg/dL    Alkaline Phosphatase 75 55 - 135 U/L    AST 17 10 - 40 U/L    ALT 23 10 - 44 U/L    eGFR >60 >60 mL/min/1.73 m^2    Anion Gap 9 8 - 16 mmol/L   Troponin I #1   Result Value Ref Range    Troponin I <0.006 0.000 - 0.026 ng/mL   EKG 12-lead   Result Value Ref Range    QRS Duration 90 ms    [facility] QTC Calculation 416 ms         Imaging Results:  Imaging Results              X-Ray Chest AP Portable (Final result)  Result time 07/09/24 15:13:42      Final result by Robert Clayton MD (07/09/24 15:13:42)                   Impression:      1.  Negative for acute process involving the chest.    2.  Stable findings as noted above.      Electronically signed by: Robert Clayton MD  Date:    07/09/2024  Time:    15:13               Narrative:    EXAMINATION:  XR CHEST AP PORTABLE    CLINICAL HISTORY:  Chest Pain;    COMPARISON:  Studies dating back to March 21, 2019    FINDINGS:  The lungs are clear. The cardiac silhouette size is normal. The trachea is midline and the mediastinal width is normal. Negative for focal infiltrate, effusion or pneumothorax. Pulmonary vasculature is normal. Negative for osseous abnormalities. Stable mildly tortuous aorta, degenerative changes of the spine and cardiophrenic fat pads.                                       The EKG was ordered, reviewed, and independently interpreted by the ED provider.  Interpretation time: 14:21  Rate: 73 BPM  Rhythm: normal sinus rhythm  Interpretation: Cannot rule out anterior infarct, age undetermined. No STEMI.           The Emergency Provider reviewed the vital signs and test results, which are outlined above.     ED Discussion     8:40 PM: Reassessed pt at this time. Discussed with pt all pertinent ED information and results. Discussed pt dx and  plan of tx. Gave pt all f/u and return to the ED instructions. All questions and concerns were addressed at this time. Pt expresses understanding of information and instructions, and is comfortable with plan to discharge. Pt is stable for discharge.    I discussed with patient and/or family/caretaker that evaluation in the ED does not suggest any emergent or life threatening medical conditions requiring immediate intervention beyond what was provided in the ED, and I believe patient is safe for discharge.  Regardless, an unremarkable evaluation in the ED does not preclude the development or presence of a serious of life threatening condition. As such, patient was instructed to return immediately for any worsening or change in current symptoms.         Medical Decision Making  Amount and/or Complexity of Data Reviewed  Labs: ordered. Decision-making details documented in ED Course.  Radiology: ordered. Decision-making details documented in ED Course.  ECG/medicine tests: ordered and independent interpretation performed. Decision-making details documented in ED Course.                ED Medication(s):  Medications - No data to display    Discharge Medication List as of 7/9/2024  8:42 PM           Follow-up Information       Yo Bradford MD In 1 day.    Specialty: Family Medicine  Contact information:  [address] [facility] [address]  [phone]               [facility] CARDIOLOGY In 2 days.    Specialty: Cardiology  Contact information:  [address] [facility] [address]  [phone]             [facility] - Emergency Dept..    Specialty: Emergency Medicine  Why: As needed, If symptoms worsen  Contact information:  [address] [facility] [id number] [phone]                               Scribe Attestation:   Scribe #1: I performed the above scribed service and the documentation accurately describes the services I performed. I attest to the  accuracy of the note.     Attending:   Physician Attestation Statement for Scribe #1: I, Krista Alas MD, personally performed the services described in this documentation, as scribed by Medina Del Rio, in my presence, and it is both accurate and complete.           Clinical Impression       ICD-10-CM ICD-9-CM   1. Chest pain  R07.9 786.50       Disposition:   Disposition: Discharged  Condition: Stable         Krista Alas MD  07/10/24 0509

## 2024-07-11 LAB
OHS QRS DURATION: 90 MS
OHS QTC CALCULATION: 416 MS

## 2024-07-22 ENCOUNTER — OFFICE VISIT (OUTPATIENT)
Dept: CARDIOLOGY | Facility: CLINIC | Age: 54
End: 2024-07-22
Payer: COMMERCIAL

## 2024-07-22 VITALS
HEART RATE: 75 BPM | WEIGHT: 190.94 LBS | SYSTOLIC BLOOD PRESSURE: 118 MMHG | BODY MASS INDEX: 30.69 KG/M2 | HEIGHT: 66 IN | DIASTOLIC BLOOD PRESSURE: 78 MMHG | OXYGEN SATURATION: 100 %

## 2024-07-22 DIAGNOSIS — Z91.89 RISK OF MYOCARDIAL INFARCTION OR STROKE 7.5% OR GREATER IN NEXT 10 YEARS: ICD-10-CM

## 2024-07-22 DIAGNOSIS — E78.49 OTHER HYPERLIPIDEMIA: ICD-10-CM

## 2024-07-22 DIAGNOSIS — Z72.0 TOBACCO ABUSE: ICD-10-CM

## 2024-07-22 DIAGNOSIS — I25.10 NONOBSTRUCTIVE ATHEROSCLEROSIS OF CORONARY ARTERY: Primary | ICD-10-CM

## 2024-07-22 DIAGNOSIS — E66.9 OBESITY (BMI 30.0-34.9): ICD-10-CM

## 2024-07-22 PROCEDURE — 3078F DIAST BP <80 MM HG: CPT | Mod: CPTII,S$GLB,, | Performed by: STUDENT IN AN ORGANIZED HEALTH CARE EDUCATION/TRAINING PROGRAM

## 2024-07-22 PROCEDURE — 99999 PR PBB SHADOW E&M-EST. PATIENT-LVL III: CPT | Mod: PBBFAC,,, | Performed by: STUDENT IN AN ORGANIZED HEALTH CARE EDUCATION/TRAINING PROGRAM

## 2024-07-22 PROCEDURE — 3008F BODY MASS INDEX DOCD: CPT | Mod: CPTII,S$GLB,, | Performed by: STUDENT IN AN ORGANIZED HEALTH CARE EDUCATION/TRAINING PROGRAM

## 2024-07-22 PROCEDURE — 3074F SYST BP LT 130 MM HG: CPT | Mod: CPTII,S$GLB,, | Performed by: STUDENT IN AN ORGANIZED HEALTH CARE EDUCATION/TRAINING PROGRAM

## 2024-07-22 PROCEDURE — 99214 OFFICE O/P EST MOD 30 MIN: CPT | Mod: S$GLB,,, | Performed by: STUDENT IN AN ORGANIZED HEALTH CARE EDUCATION/TRAINING PROGRAM

## 2024-07-22 NOTE — PROGRESS NOTES
"              Section of Cardiology                  Cardiac Clinic Note    Chief Complaint/Reason for consultation: risk factors for CAD      HPI:   Lv Joyce is a 54 y.o. male with h/o HLD, tobacco abuse, nonobs CAD who comes in to cardiology clinic as a referral by PCP Dr. Bradford for evaluation.        7/19/23  Comes in with symptoms of left sided sensations, "feels funny" (left arm, neck, flank) usually after eating  Symptoms relieved with prilosec  Similar to feelings he had in 2019, saw Dr. Ayoub in cardiology, stress test was abnormal  [facility] 2019 minimal CAD, nl EF  No associated symptoms  Has GERD  Cannot sleep flat   Has no issues with his activity, heavy lifting/up and down ladders   Has not been eating healthy for lunch with his new job, tends to eat at [employer]- "on the go"  Tries to eat vegetables   Drinks pot of coffee in AM and PM    Does not take any meds  No history of HTN  Denies CINDY, was told he does not have it   Denies fatigue throughout the day    Denies SOB, chest pain   1ppd day smoker     Family hx: no heart disease         7/22/24  Had stomach issues after eating fatty foods   Had some SOB, chest pain  BP stable today  Not exercising  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     2 - Normal left ventricular diastolic function.     3 - No wall motion abnormalities.     4 - Normal right ventricular systolic function .     5 - Concentric remodeling.                STRESS TEST No results found for this or any previous visit.       [facility] Results for orders placed during the hospital encounter of 11/09/18    Cath lab procedure    Narrative  Date of Procedure:  11/09/2018    A. Indication/Pre-Operative Diagnosis    The patient is a 48 year old male that was " referred for catheterization by Dr. Elier Ayoub for ACS (unstable angina) and abnormal CV function study (Intermediate risk).    B. Summary/Post-Operative Diagnosis    1.   Minimal CAD.  2.   Normal LVEF.    C. HPI    I have reviewed the history and physical completed on 11/09/2018. The patient has been examined and I concur with the findings from 11/09/2018.    Patient history was obtained from the patient.    Counseling: The patient was counseled regarding the potential risks and benefits of this procedure, as well as possible alternative approaches to the problem and gave informed consent.    The ASA Score was Class II.    [facility] Risk Score for MACE is 1.40%. [facility] Risk Score for Death is 0.10%.    Height: 67 in.      Weight: 202 lbs.      BMI: 31.60 kg/m2    Laboratory data revealed:    11/09/2018 CREAT  0.8, GLU  128, HCT  41.1, HGB  13.9, K  3.8, NA  136, PLT  276, WBCIR  8.52, BUN  9        ACS Enzymes:    10/18/2018 TROP  <0.00  10/18/2018 TROP  <0.00  11/09/2018 CPK  109, TROP  <0.00          D. Hemodynamic Results    LVEDP (Pre): 15 mmHg  LVEDP (Post): 15 mmHg  Ejection Fraction: 65%  No significant gradient noted on pullback from LV  No significant MR noted    AIR REST:  AO: 122/74 (95)  BP: 130/78  ETCO2: 42  HR: 76  LV: 116/6  LVEDP: 15    RSP: 18  SPO2: 99    E. Angiographic Results    Diagnostic:    Patient has a right dominant coronary artery.    - Left Main Coronary Artery:  The LM is normal. There is TERRI 3 flow.  - Left Anterior Descending Artery:  The mid LAD has a 10% stenosis. There is TERRI 3 flow. The remaining portion of the vessel is normal.  - D1:  The D1 is normal. There is TERRI 3 flow.  - Left Circumflex Artery:  The LCX is normal. There is TERRI 3 flow.  - OM1:  The OM1 is normal. There is TERRI 3 flow.  - Right Coronary Artery:  The RCA is normal. There is TERRI 3 flow.    F. Details of Procedure    PROCEDURES PERFORMED: LHC, Left Ventriculogram and Coronary Angio    ANESTHESIA:  Conscious sedation was achieved with 50 mcg of Fentanyl, 50 ml of Fentanyl and 4 mg of Versed. Local anesthesia was achieved with 20 Lidocaine 2%. Moderate conscious sedation was performed and cardiorespiratory functions were monitored the  entire procedure by Chanel Padilla RN. Sedation began at 12:11 PM and concluded at 12:42 PM, totalling 30.6 minutes.    PRIMARY SURGEON: Waylon Galvan MD    COMPLICATIONS: There were no complications.    Medications given on sterile field: Lidocaine 2% (20 ) and Nitroglycerine 200mcg/ml (20 ml).    Medications given during procedure: Heparin Bag 1000 Units/500ml (3 bags), Versed (4 mg), Fentanyl (100 mcg), Heparin 1000 Units/ml (3000 units) and Verapamil (calan) 5mg/2ml (2 mg).    The patient was brought to the catheterization laboratory. Bilateral groin prepped and draped. Right radial prepped and draped. After local anesthesia, a Sheath Terumo 6fr Glidesheath was inserted into the right Radial artery.    AORTA    A Wire Wholey 035 X 300 was inserted into the Aorta.    LM    A Cath Radial Tig 4.0 5fr was inserted into the ostial LM. The Wire Wholey 035 X 300 was removed. All catheter exchanges performed over a wire. Angiography performed in multiple views in the left coronary arteries. The Cath Radial Tig 4.0 5fr was  removed.    RCA    A Cath Impulse 5fr Fr4 was inserted into the ostial RCA. Angiography performed in multiple views in the right coronary arteries. The Cath Impulse 5fr Fr4 was removed.    Left  VENTRICLE    A Cath 5fr Pigtail Angled was inserted into the left ventricle. Hemodynamics recorded in the left ventricle. Angiography performed in the left ventricle. Hemodynamics recorded in the left ventricle.    The Cath 5fr Pigtail Angled was removed. A Tr Band was inserted into the right Radial artery. The Sheath Terumo 6fr Glidesheath was removed. A Tr Band was applied to the right Radial artery. TR Bamd has 10 mLs of air. Total Omnipaque injected was 90.0  ml. Total  Omnipaque used was 150.0 ml.      Fluoroscopy Time 3.8 minutes  Radiation Dose 489.8 mGy  Contrast Injected 90 ml Omnipaque  Contrast Used  150 ml Omnipaque        Procedure log documented by Raina Hall RN and verified by Waylon Galvan MD    ESTIMATED BLOOD LOSS is < 50 cc.    SPECIMEN: No specimen.    G. Recommendations    1. Routine post-cath care.  2. Maximize medical management.  3. Risk factor reductions.    I certify that I was present for catheter insertion, catheter manipulation, angiography, and angiographic interpretation of this patient.    This document has been electronically  SIGNED BY: Waylon Galvan MD On: 11/09/2018 12:47            ROS: All 10 systems reviewed. Please refer to the HPI for pertinent positives. All other systems negative.     Past Medical History  Past Medical History:   Diagnosis Date    Hyperlipidemia     Nonobstructive atherosclerosis of coronary artery 1/7/2019    CINDY (obstructive sleep apnea) 12/23/2019       Surgical History  Past Surgical History:   Procedure Laterality Date    CARDIAC CATHETERIZATION      LEFT HEART CATHETERIZATION Left 11/9/2018    Procedure: CATHETERIZATION, HEART, LEFT;  Surgeon: Waylon Galvan MD;  Location: [facility] CATH LAB;  Service: Cardiology;  Laterality: Left;          Allergies:   Review of patient's allergies indicates:  No Known Allergies    Social History:  Social History     Socioeconomic History    Marital status:    Tobacco Use    Smoking status: Every Day     Current packs/day: 1.00     Average packs/day: 1 pack/day for 36.6 years (36.6 ttl pk-yrs)     Types: Cigarettes, Vaping with nicotine     Start date: 1/1/1988    Smokeless tobacco: Never   Substance and Sexual Activity    Alcohol use: No    Drug use: No       Family History:  family history includes Anxiety disorder in his mother; Hyperlipidemia in his father and mother; Hypertension in his mother; No Known Problems in his brother and brother.    Home Medications:  Current  "Outpatient Medications on File Prior to Visit   Medication Sig Dispense Refill    aspirin (ECOTRIN) 81 MG EC tablet Take 81 mg by mouth once daily.      atorvastatin (LIPITOR) 40 MG tablet Take 1 tablet (40 mg total) by mouth once daily. 90 tablet 3    meclizine (ANTIVERT) 25 mg tablet Take 25 mg by mouth 3 (three) times daily as needed.      omeprazole magnesium (PRILOSEC ORAL) Take by mouth.      pyrilamine-dextromethorphan 7.5-7.5 mg/5 mL Liqd Take 20 mLs by mouth every 8 (eight) hours as needed. (Patient not taking: Reported on 7/22/2024)       No current facility-administered medications on file prior to visit.       Physical exam:  /78 (BP Location: Right arm, Patient Position: Sitting, BP Method: Medium (Manual))   Pulse 75   Ht 5' 6" (1.676 m)   Wt 86.6 kg (190 lb 14.7 oz)   SpO2 100%   BMI 30.81 kg/m²         General: Pt is a 54 y.o. year old male who is AAOx3, in NAD, is pleasant, well nourished, looks stated age  HEENT: PERRL, EOMI, Oral mucosa pink & moist  CVS  No abnormal cardiac pulsations noted on inspection. JVP not raised. The apical impulse is normal on palpation, and is located in the left 5th intercostal space in the mid - clavicular line. No palpable thrills or abnormal pulsations noted. RR, S1 - S2 heard, no murmurs, rubs or gallops appreciated.   PUL : CTA B/L. No wheezes/crackles heard   ABD : BS +, soft. No tenderness elicited   LE : No C/C/E. Distal Pulses palpable B/L         LABS:    Chemistry:   Lab Results   Component Value Date     07/09/2024    K 4.3 07/09/2024     07/09/2024    CO2 20 (L) 07/09/2024    BUN 11 07/09/2024    CREATININE 0.9 07/09/2024    CALCIUM 9.7 07/09/2024     Cardiac Markers:   Lab Results   Component Value Date    TROPONINI <0.006 07/09/2024     Cardiac Markers (Last 3):   Lab Results   Component Value Date    TROPONINI <0.006 07/09/2024    TROPONINI <0.006 07/10/2023    TROPONINI 0.01 01/09/2022    TROPONINI <0.006 03/21/2019     CBC: " "  Lab Results   Component Value Date    WBC 9.40 07/09/2024    HGB 14.8 07/09/2024    HCT 43.5 07/09/2024    MCV 85 07/09/2024     07/09/2024     Lipids:   Lab Results   Component Value Date    CHOL 200 (H) 07/17/2023    TRIG 134 07/17/2023    HDL 38 (L) 07/17/2023     Coagulation: No results found for: "PT", "INR", "APTT"        Assessment    1. Nonobstructive atherosclerosis of coronary artery    2. Tobacco abuse    3. Obesity (BMI 30.0-34.9)    4. Other hyperlipidemia    5. Risk of myocardial infarction or stroke 7.5% or greater in next 10 years        Plan:  Nonobstructive CAD  Atypical symptoms, similar to prior  [facility] 2019 with nonobstructive CAD   Continue aspirin and statin    HLD   as of 7/23   Continue statin  Recheck lipids     Tobacco abuse   Smoking cessation encouraged    Obesity, Body mass index is 30.81 kg/m².   Low salt, low fat diet  Exercise as tolerated, at least 30 min daily     This note was prepared using voice recognition system and is likely to have sound alike errors that may have been overlooked even after proofreading.     I have reviewed all pertinent chart information.  Plans and recommendations have been formulated under my direct supervision. All questions answered and patient voiced understanding.   If symptoms persist go to the ED.    RTC in 1 year        Kateryna Martínez MD  Cardiology            "

## 2024-07-22 NOTE — TELEPHONE ENCOUNTER
Care Due:                  Date            Visit Type   Department     Provider  --------------------------------------------------------------------------------                                EP -                              PRIMARY      [facility] INTERNAL  Last Visit: 08-      CARE ([facility])   MEDICINE       Yo Bradford  Next Visit: None Scheduled  None         None Found                                                            Last  Test          Frequency    Reason                     Performed    Due Date  --------------------------------------------------------------------------------    Lipid Panel.  12 months..  atorvastatin.............  07- 07-    [facility] Embedded Care Due Messages. Reference number: [id number].   7/22/2024 4:24:19 PM CDT

## 2024-07-23 ENCOUNTER — LAB VISIT (OUTPATIENT)
Dept: LAB | Facility: HOSPITAL | Age: 54
End: 2024-07-23
Attending: STUDENT IN AN ORGANIZED HEALTH CARE EDUCATION/TRAINING PROGRAM
Payer: COMMERCIAL

## 2024-07-23 DIAGNOSIS — I25.10 NONOBSTRUCTIVE ATHEROSCLEROSIS OF CORONARY ARTERY: ICD-10-CM

## 2024-07-23 DIAGNOSIS — Z72.0 TOBACCO ABUSE: ICD-10-CM

## 2024-07-23 DIAGNOSIS — E66.9 OBESITY (BMI 30.0-34.9): ICD-10-CM

## 2024-07-23 DIAGNOSIS — E78.49 OTHER HYPERLIPIDEMIA: ICD-10-CM

## 2024-07-23 DIAGNOSIS — Z91.89 RISK OF MYOCARDIAL INFARCTION OR STROKE 7.5% OR GREATER IN NEXT 10 YEARS: ICD-10-CM

## 2024-07-23 LAB
CHOLEST SERPL-MCNC: 150 MG/DL (ref 120–199)
CHOLEST/HDLC SERPL: 3.9 {RATIO} (ref 2–5)
HDLC SERPL-MCNC: 38 MG/DL (ref 40–75)
HDLC SERPL: 25.3 % (ref 20–50)
LDLC SERPL CALC-MCNC: 90 MG/DL (ref 63–159)
NONHDLC SERPL-MCNC: 112 MG/DL
TRIGL SERPL-MCNC: 110 MG/DL (ref 30–150)

## 2024-07-23 PROCEDURE — 80061 LIPID PANEL: CPT | Performed by: STUDENT IN AN ORGANIZED HEALTH CARE EDUCATION/TRAINING PROGRAM

## 2024-07-23 PROCEDURE — 36415 COLL VENOUS BLD VENIPUNCTURE: CPT | Performed by: STUDENT IN AN ORGANIZED HEALTH CARE EDUCATION/TRAINING PROGRAM

## 2024-07-24 RX ORDER — ATORVASTATIN CALCIUM 40 MG/1
40 TABLET, FILM COATED ORAL
Qty: 90 TABLET | Refills: 0 | Status: SHIPPED | OUTPATIENT
Start: 2024-07-24

## 2024-09-23 ENCOUNTER — HOSPITAL ENCOUNTER (OUTPATIENT)
Dept: RADIOLOGY | Facility: HOSPITAL | Age: 54
Discharge: HOME | End: 2024-09-23
Attending: FAMILY MEDICINE
Payer: COMMERCIAL

## 2024-09-23 DIAGNOSIS — Z72.0 TOBACCO ABUSE: ICD-10-CM

## 2024-09-23 PROCEDURE — 71271 CT THORAX LUNG CANCER SCR C-: CPT | Mod: TC

## 2024-09-23 PROCEDURE — 71271 CT THORAX LUNG CANCER SCR C-: CPT | Mod: 26,,, | Performed by: STUDENT IN AN ORGANIZED HEALTH CARE EDUCATION/TRAINING PROGRAM

## 2024-10-31 RX ORDER — ATORVASTATIN CALCIUM 40 MG/1
40 TABLET, FILM COATED ORAL
Qty: 90 TABLET | Refills: 0 | Status: SHIPPED | OUTPATIENT
Start: 2024-10-31

## 2024-11-12 ENCOUNTER — LAB VISIT (OUTPATIENT)
Dept: LAB | Facility: HOSPITAL | Age: 54
End: 2024-11-12
Attending: FAMILY MEDICINE
Payer: COMMERCIAL

## 2024-11-12 ENCOUNTER — OFFICE VISIT (OUTPATIENT)
Dept: INTERNAL MEDICINE | Facility: CLINIC | Age: 54
End: 2024-11-12
Payer: COMMERCIAL

## 2024-11-12 VITALS
WEIGHT: 193.13 LBS | BODY MASS INDEX: 31.17 KG/M2 | DIASTOLIC BLOOD PRESSURE: 78 MMHG | HEART RATE: 88 BPM | RESPIRATION RATE: 20 BRPM | TEMPERATURE: 96 F | OXYGEN SATURATION: 99 % | SYSTOLIC BLOOD PRESSURE: 128 MMHG

## 2024-11-12 DIAGNOSIS — Z87.898 HISTORY OF PREDIABETES: ICD-10-CM

## 2024-11-12 DIAGNOSIS — Z12.5 SCREENING FOR PROSTATE CANCER: ICD-10-CM

## 2024-11-12 DIAGNOSIS — Z00.00 ANNUAL PHYSICAL EXAM: Primary | ICD-10-CM

## 2024-11-12 LAB
COMPLEXED PSA SERPL-MCNC: 1.6 NG/ML (ref 0–4)
ESTIMATED AVG GLUCOSE: 117 MG/DL (ref 68–131)
HBA1C MFR BLD: 5.7 % (ref 4–5.6)

## 2024-11-12 PROCEDURE — 83036 HEMOGLOBIN GLYCOSYLATED A1C: CPT | Performed by: FAMILY MEDICINE

## 2024-11-12 PROCEDURE — 3074F SYST BP LT 130 MM HG: CPT | Mod: CPTII,S$GLB,, | Performed by: FAMILY MEDICINE

## 2024-11-12 PROCEDURE — 99999 PR PBB SHADOW E&M-EST. PATIENT-LVL IV: CPT | Mod: PBBFAC,,, | Performed by: FAMILY MEDICINE

## 2024-11-12 PROCEDURE — 99396 PREV VISIT EST AGE 40-64: CPT | Mod: S$GLB,,, | Performed by: FAMILY MEDICINE

## 2024-11-12 PROCEDURE — 1159F MED LIST DOCD IN RCRD: CPT | Mod: CPTII,S$GLB,, | Performed by: FAMILY MEDICINE

## 2024-11-12 PROCEDURE — 84153 ASSAY OF PSA TOTAL: CPT | Performed by: FAMILY MEDICINE

## 2024-11-12 PROCEDURE — 36415 COLL VENOUS BLD VENIPUNCTURE: CPT | Performed by: FAMILY MEDICINE

## 2024-11-12 PROCEDURE — 3008F BODY MASS INDEX DOCD: CPT | Mod: CPTII,S$GLB,, | Performed by: FAMILY MEDICINE

## 2024-11-12 PROCEDURE — 3078F DIAST BP <80 MM HG: CPT | Mod: CPTII,S$GLB,, | Performed by: FAMILY MEDICINE

## 2024-11-12 NOTE — PROGRESS NOTES
Subjective:       Patient ID: Lv Joyce is a 54 y.o. male.    Chief Complaint: Annual Exam (He is here for an annual exam. States when sitting he feels pressure and his blood pressure is elevated, once he stands his blood pressure is normalized.)    HPI    Patient Active Problem List   Diagnosis    Other chest pain    Lightheadedness    Other hyperlipidemia    Tobacco abuse    Palpitations    Nonobstructive atherosclerosis of coronary artery    Cerebral microvascular disease       Past Medical History:   Diagnosis Date    Hyperlipidemia     Nonobstructive atherosclerosis of coronary artery 1/7/2019    CINDY (obstructive sleep apnea) 12/23/2019       Past Surgical History:   Procedure Laterality Date    CARDIAC CATHETERIZATION      LEFT HEART CATHETERIZATION Left 11/9/2018    Procedure: CATHETERIZATION, HEART, LEFT;  Surgeon: Waylon Galvan MD;  Location: [facility] CATH LAB;  Service: Cardiology;  Laterality: Left;       Family History   Problem Relation Name Age of Onset    Hyperlipidemia Mother Anjana Joyce     Hypertension Mother Anjana Joyce     Anxiety disorder Mother Anjana Joyce     Hyperlipidemia Father Jan Joyce     No Known Problems Brother      No Known Problems Brother         Social History     Tobacco Use   Smoking Status Every Day    Current packs/day: 1.00    Average packs/day: 1 pack/day for 36.9 years (36.9 ttl pk-yrs)    Types: Cigarettes    Start date: 1/1/1988   Smokeless Tobacco Never   Tobacco Comments    Pack a day smoker       Wt Readings from Last 5 Encounters:   11/12/24 87.6 kg (193 lb 2 oz)   07/22/24 86.6 kg (190 lb 14.7 oz)   07/09/24 86.7 kg (191 lb 3.2 oz)   08/28/23 82.9 kg (182 lb 10.4 oz)   07/19/23 86.4 kg (190 lb 7.6 oz)       History of Present Illness    CHIEF COMPLAINT:  Patient presents for an annual wellness visit  HPI:  Patient reports a pressure sensation when sitting down that resolves when standing. This pressure is described as a generalized  feeling throughout the body rather than localized to a specific area. Patient's blood pressure tends to be elevated when taken while sitting but normalizes when standing. Patient is uncertain about the cause of this pressure, considering potential factors such as abdominal adiposity or other underlying issues. Patient denies abdominal pain, issues with penis or testicles, swelling in the legs, or muscle and joint aches that could be attributed to statin medication.    MEDICATIONS:  Patient is on Lipitor 40 mg daily for hyperlipidemia. He takes Aspirin as needed for cardiovascular health, though he takes it only when he remembers. For reflux and heartburn, he uses Prilosec as needed for 2 days at a time.(PRN seems sufficient)    MEDICAL HISTORY:  Patient has a history of hyperlipidemia, nonobstructive atherosclerosis of coronary arteries, and cerebral microvascular disease. He was diagnosed with obstructive sleep apnea in 2019. He also has prediabetes and GERD.        TEST RESULTS:  Three months ago, the patient's cholesterol panel showed a decrease in LDL from 135 to 90. A comprehensive metabolic panel from the same time revealed liver, kidneys, and electrolytes within normal limits. A complete blood count was also performed with no concerning findings. In July 2023, his PSA was 1.2 and Hemoglobin A1c was 5.4. Patient underwent a sleep study in 2019, which identified mild positional obstructive sleep apnea.    IMAGING:  A CT Chest for lung cancer screening was performed in September 2023, showing no indication of lung cancer but coronary atherosclerosis was identified. A CT Head from July 2023 noted chronic microvascular ischemic changes.    SOCIAL HISTORY:  Patient is a current smoker. He has tried Chantix in the past and managed to quit for 1 month before relapsing. He works as an [employer]      Review of Systems   Constitutional:  Negative for activity change and unexpected  weight change.   HENT:  Negative for hearing loss, rhinorrhea and trouble swallowing.    Eyes:  Negative for discharge and visual disturbance.   Respiratory:  Negative for chest tightness and wheezing.    Cardiovascular:  Negative for chest pain and palpitations.   Gastrointestinal:  Negative for blood in stool, constipation, diarrhea and vomiting.   Endocrine: Negative for polydipsia and polyuria.   Genitourinary:  Negative for difficulty urinating, hematuria and urgency.   Musculoskeletal:  Negative for arthralgias, joint swelling and neck pain.   Neurological:  Negative for weakness and headaches.   Psychiatric/Behavioral:  Negative for confusion and dysphoric mood.        Objective:      Vitals:    11/12/24 0800   BP: 128/78   Pulse:    Resp:    Temp:        Physical Exam  Constitutional:       General: He is not in acute distress.     Appearance: He is not ill-appearing.   HENT:      Head: Normocephalic.   Cardiovascular:      Rate and Rhythm: Normal rate and regular rhythm.   Pulmonary:      Effort: Pulmonary effort is normal. No respiratory distress.   Neurological:      General: No focal deficit present.      Mental Status: He is alert.   Psychiatric:         Mood and Affect: Mood normal.         Behavior: Behavior normal.         Assessment:       1. Annual physical exam    2. Screening for prostate cancer    3. History of prediabetes        Plan:   Annual physical exam    Screening for prostate cancer  -     PSA, Screening; Future; Expected date: 11/12/2024    History of prediabetes  -     Hemoglobin A1C; Future; Expected date: 11/12/2024    Assessment & Plan    PLAN SUMMARY:  Continue Lipitor 40 mg daily  Ordered labs for sugar levels and PSA  Continue aspirin daily  Continue Prilosec as needed for reflux symptoms  Follow Mediterranean diet  Avoid reflux-triggering foods  Exercise regularly and aim for weight loss to 170s  Quit smoking  Follow up in September 2025 for annual exam, lab work, and chest  CT    HYPERLIPIDEMIA:  Reviewed patient's history of hyperlipidemia.  Noted improvement in cholesterol panel with LDL reduction from 135 to 90.  Continued Lipitor 40 mg daily.    SMOKING:  Reviewed patient's history of smoking.  Patient to quit smoking.    CORONARY ARTERY DISEASE:  Reviewed patient's history of nonobstructive atherosclerosis of coronary arteries.  Reviewed September CT showing no lung cancer but mild coronary atherosclerosis.  Explained nonobstructive coronary disease as atherosclerotic processes in heart blood vessels.  Continued aspirin daily, emphasizing consistent use.    CEREBRAL MICROVASCULAR DISEASE:  Reviewed patient's history of cerebral microvascular disease.  Assessed July 2023 head CT indicating chronic microvascular ischemic changes.  Encouraged smoking cessation, statin, asa usage    PREDIABETES:  Considered prediabetes history with recent HbA1c of 5.4%.  Ordered labs for sugar levels.    OBSTRUCTIVE SLEEP APNEA:  Acknowledged mild positional obstructive sleep apnea diagnosis from 2019. He opts out of any tx modality      GASTROESOPHAGEAL REFLUX DISEASE (GERD):  Patient to avoid foods that trigger reflux symptoms.  Continued Prilosec as needed for reflux symptoms.    PROSTATE HEALTH:  Evaluated PSA level of 1.2 from July 2023, within normal range.  Ordered labs for PSA.    WEIGHT MANAGEMENT:  Recommend weight loss to reach 170s (from current 193 lbs).    LABS:  Assessed recent lab work showing normal liver, kidney, and electrolyte functions.    OTHER INSTRUCTIONS:  Patient to exercise regularly.  Patient to follow Mediterranean diet.    FOLLOW UP:  Follow up in September 2025 for annual exam, lab work, and chest CT.       He declines all vaccinations

## 2025-01-30 ENCOUNTER — PATIENT MESSAGE (OUTPATIENT)
Dept: CARDIOLOGY | Facility: HOSPITAL | Age: 55
End: 2025-01-30
Payer: COMMERCIAL

## 2025-02-06 RX ORDER — ATORVASTATIN CALCIUM 40 MG/1
40 TABLET, FILM COATED ORAL
Qty: 90 TABLET | Refills: 1 | Status: SHIPPED | OUTPATIENT
Start: 2025-02-06

## 2025-02-06 NOTE — TELEPHONE ENCOUNTER
Refill Decision Note   Lv Joyce  is requesting a refill authorization.  Brief Assessment and Rationale for Refill:  Approve     Medication Therapy Plan:         Comments:     Note composed:3:00 PM 02/06/2025             Appointments     Last Visit   11/12/2024 Yo Bradford MD   Next Visit   Visit date not found Yo Bradford MD

## 2025-02-06 NOTE — TELEPHONE ENCOUNTER
No care due was identified.  [facility] Embedded Care Due Messages. Reference number: [id number].   2/06/2025 1:31:27 PM CST

## 2025-07-10 ENCOUNTER — HOSPITAL ENCOUNTER (OUTPATIENT)
Dept: RADIOLOGY | Facility: HOSPITAL | Age: 55
Discharge: HOME | End: 2025-07-10
Payer: COMMERCIAL

## 2025-07-10 ENCOUNTER — PATIENT MESSAGE (OUTPATIENT)
Dept: INTERNAL MEDICINE | Facility: CLINIC | Age: 55
End: 2025-07-10

## 2025-07-10 ENCOUNTER — OFFICE VISIT (OUTPATIENT)
Dept: INTERNAL MEDICINE | Facility: CLINIC | Age: 55
End: 2025-07-10
Payer: COMMERCIAL

## 2025-07-10 VITALS
SYSTOLIC BLOOD PRESSURE: 136 MMHG | OXYGEN SATURATION: 97 % | DIASTOLIC BLOOD PRESSURE: 84 MMHG | HEART RATE: 70 BPM | TEMPERATURE: 98 F | HEIGHT: 66 IN | BODY MASS INDEX: 31.22 KG/M2 | WEIGHT: 194.25 LBS

## 2025-07-10 DIAGNOSIS — M54.9 DORSALGIA: ICD-10-CM

## 2025-07-10 DIAGNOSIS — R06.83 SNORING: ICD-10-CM

## 2025-07-10 DIAGNOSIS — R40.0 DAYTIME SOMNOLENCE: Primary | ICD-10-CM

## 2025-07-10 PROCEDURE — 72050 X-RAY EXAM NECK SPINE 4/5VWS: CPT | Mod: 26,,, | Performed by: RADIOLOGY

## 2025-07-10 PROCEDURE — 1160F RVW MEDS BY RX/DR IN RCRD: CPT | Mod: CPTII,S$GLB,,

## 2025-07-10 PROCEDURE — 72110 X-RAY EXAM L-2 SPINE 4/>VWS: CPT | Mod: 26,,, | Performed by: RADIOLOGY

## 2025-07-10 PROCEDURE — 99999 PR PBB SHADOW E&M-EST. PATIENT-LVL V: CPT | Mod: PBBFAC,,,

## 2025-07-10 PROCEDURE — 3008F BODY MASS INDEX DOCD: CPT | Mod: CPTII,S$GLB,,

## 2025-07-10 PROCEDURE — 72110 X-RAY EXAM L-2 SPINE 4/>VWS: CPT | Mod: TC

## 2025-07-10 PROCEDURE — 3079F DIAST BP 80-89 MM HG: CPT | Mod: CPTII,S$GLB,,

## 2025-07-10 PROCEDURE — 1159F MED LIST DOCD IN RCRD: CPT | Mod: CPTII,S$GLB,,

## 2025-07-10 PROCEDURE — 3075F SYST BP GE 130 - 139MM HG: CPT | Mod: CPTII,S$GLB,,

## 2025-07-10 PROCEDURE — 99214 OFFICE O/P EST MOD 30 MIN: CPT | Mod: S$GLB,,,

## 2025-07-10 PROCEDURE — 72050 X-RAY EXAM NECK SPINE 4/5VWS: CPT | Mod: TC

## 2025-07-10 NOTE — PROGRESS NOTES
"Lv Joyce  07/10/2025  [id number]    Yo Bradford MD  Patient Care Team:  Yo Bradford MD as PCP - General (Family Medicine)          Visit Type:ER follow up    Chief Complaint:  Chief Complaint   Patient presents with    Follow-up    Fatigue        History of Present Illness:    History of Present Illness        He went to the ER on yesterday, from the note        C/O intermittent dizziness this am, he says when he has this he "loosens his belt and it goes away"       55yom with hx including GERD, HLD, hypertension who presents with intermittent dizziness this a.m. He reports once or twice a week he gets a sensation where he feels like he has to loosen his belt and it goes away. Today he had that sensation followed by a few seconds of dizziness when he glanced down while driving from [place]. He does work outside and this happened more than once prompting him to stop at this ER. He denies chest pain, shortness of breath, nausea, diaphoresis, urinary symptoms. He ate toast and 2 eggs this morning. He reports in 2019 he had vertigo and has had episodes of this since then including evaluated with a negative heart cath in 2023. He does not take any prescription or over-the-counter medicines for vertigo currently. He takes Prilosec as needed.     History of Present Illness    CHIEF COMPLAINT:  Mr. Joyce presents today with concerns about nodding off while walking and experiencing pressure sensations    SLEEP CONCERNS:  He reports episodes of nodding off while walking and driving, particularly when looking at his phone. He describes these as brief losses of alertness without full loss of consciousness. He denies taking naps. He has mild snoring. A previous sleep study at [place] [facility] stated that he did not have sleep apnea     GASTROINTESTINAL:  He reports abdominal pressure while driving that requires loosening of clothing. He has irregular bowel movements, characterized by infrequent " "small stools during weekdays and multiple movements on weekends. He has acid reflux managed intermittently with proton pump inhibitor. Current reflux symptoms coincide with recent consumption of processed, fried, and greasy foods.    MUSCULOSKELETAL:  He reports current neck and back pain, that occurs intermittmently. The neck pain is variable in location, affecting different sides inconsistently. He describes a sensation of something "not moving" in the affected areas, but denies specific nerve pain. CTA imaging showed no neck blockages. He attributes symptoms to work-related stress.    OCULAR:  He reports pressure behind eyes, most pronounced before sleeping last night. Denies associated headaches or neurological symptoms.    SOCIAL HISTORY:  He is an active smoker and performs physically demanding work involving lifting, roof work, and crawling in tight spaces.    DIET:  Over the past three weeks, his diet has consisted predominantly of fast food including [employer], [employer], and restaurant pizza. He acknowledges the connection between his diet and GI symptoms.      ROS:  General: -fever, -chills, -fatigue, -weight gain, -weight loss  Eyes: -vision changes, -redness, -discharge, +eye strain, +spots, specks or flashing lights, +blurry vision  ENT: -ear pain, -nasal congestion, -sore throat  Cardiovascular: -chest pain, -palpitations, -lower extremity edema  Respiratory: -cough, -shortness of breath, +snoring  Gastrointestinal: -abdominal pain, -nausea, -vomiting, -diarrhea, +constipation, -blood in stool, +indigestion, +abdominal distention  Genitourinary: -dysuria, -hematuria, -frequency  Musculoskeletal: -joint pain, -muscle pain, +back pain, +neck pain  Skin: -rash, -lesion  Neurological: -headache, -dizziness, -numbness, -tingling, +excessive drowsiness, +vertigo  Psychiatric: -anxiety, -depression, -sleep difficulty          History:  Past Medical History:   Diagnosis Date    Hyperlipidemia     " Nonobstructive atherosclerosis of coronary artery 1/7/2019    CINDY (obstructive sleep apnea) 12/23/2019     Past Surgical History:   Procedure Laterality Date    CARDIAC CATHETERIZATION      LEFT HEART CATHETERIZATION Left 11/9/2018    Procedure: CATHETERIZATION, HEART, LEFT;  Surgeon: Waylon Galvan MD;  Location: [facility] CATH LAB;  Service: Cardiology;  Laterality: Left;     Family History   Problem Relation Name Age of Onset    Hyperlipidemia Mother Anjana Joyce     Hypertension Mother Anjana Joyce     Anxiety disorder Mother Anjana Joyce     Hyperlipidemia Father Jan Joyce     No Known Problems Brother      No Known Problems Brother       Social History[1]  Problem List[2]  Review of patient's allergies indicates:  No Known Allergies    The following were reviewed at this visit: active problem list, medication list, allergies, family history, social history, and health maintenance.    Medications:  Medications Ordered Prior to Encounter[3]    Medications have been reviewed and reconciled with patient at this visit.  Barriers to medications reviewed with patient.    Adverse reactions to current medications reviewed with patient..    Over the counter medications reviewed and reconciled with patient.    Exam:  Wt Readings from Last 3 Encounters:   11/12/24 87.6 kg (193 lb 2 oz)   07/22/24 86.6 kg (190 lb 14.7 oz)   07/09/24 86.7 kg (191 lb 3.2 oz)     Temp Readings from Last 3 Encounters:   11/12/24 (!) 95.7 °F (35.4 °C) (Tympanic)   07/09/24 98.2 °F (36.8 °C)   08/28/23 98 °F (36.7 °C) (Tympanic)     BP Readings from Last 3 Encounters:   11/12/24 128/78   07/22/24 118/78   07/09/24 (!) 145/80     Pulse Readings from Last 3 Encounters:   11/12/24 88   07/22/24 75   07/09/24 68     There is no height or weight on file to calculate BMI.    Physical Exam  Nursing note reviewed.   Constitutional:       Appearance: He is obese.   HENT:      Head: Normocephalic and atraumatic.   Cardiovascular:      Rate and  Rhythm: Regular rhythm.      Heart sounds: Normal heart sounds.   Pulmonary:      Effort: Pulmonary effort is normal. No respiratory distress.      Breath sounds: Normal breath sounds.   Musculoskeletal:         General: Tenderness present.   Neurological:      Mental Status: He is alert and oriented to person, place, and time.   Psychiatric:         Mood and Affect: Mood normal.         Behavior: Behavior normal.         Thought Content: Thought content normal.         Judgment: Judgment normal.           Laboratory Reviewed ({Yes)  Lab Results   Component Value Date    WBC 9.40 07/09/2024    HGB Negative 07/09/2025    HCT 43.5 07/09/2024     07/09/2024    CHOL 150 07/23/2024    TRIG 110 07/23/2024    HDL 38 (L) 07/23/2024    ALT 23 07/09/2024    AST 17 07/09/2024     07/09/2024    K 4.3 07/09/2024     07/09/2024    CREATININE 0.9 07/09/2024    BUN 11 07/09/2024    CO2 20 (L) 07/09/2024    TSH 1.172 01/13/2022    PSA 1.6 11/12/2024    INR 1 07/09/2025    GLUF 110 01/30/2004    HGBA1C 5.7 (H) 11/12/2024       There are no diagnoses linked to this encounter.      Assessment & Plan           Lv was seen today for follow-up and fatigue.    Diagnoses and all orders for this visit:    Daytime somnolence  -     Ambulatory referral/consult to Sleep Disorders; Future    Snoring  -     Ambulatory referral/consult to Sleep Disorders; Future    Dorsalgia  -     X-Ray Lumbar Spine 5 View; Future  -     X-Ray Cervical Spine Complete 5 view; Future      Assessment & Plan    SLEEP DISORDERS (HYPERSOMNIA/SLEEP APNEA/NARCOLEPSY):  - Mr. Joyce experiences nodding off during the day, including while walking and driving, and feels drowsy but does not fully lose consciousness.  - Mr. Joyce reports snoring and was previously evaluated by a sleep clinic where a mouthpiece was recommended instead of CPAP.  - Ordered sleep study to re-evaluate for sleep apnea as a potential cause of these episodes and possible  contributor to difficulty losing abdominal weight.  - Referred to sleep department for further evaluation; patient should await their call to schedule an appointment.  - Instructed to send [employer] message if nodding off episodes continue despite following recommendations.    GASTROESOPHAGEAL REFLUX DISEASE (GERD):  - Mr. Joyce reports pressure in the stomach area and acid reflux symptoms.  - Explained importance of consistent acid reflux medication use for symptom management.  - Prescribed Prilosec (OTC) daily, to be taken 1-2 hours before eating in the morning.    CONSTIPATION:  - Mr. Joyce reports having small bowel movements during the week and larger ones on weekends.  - Evaluated constipation as possible contributor to abdominal pressure and discomfort.  - Recommend increasing water and fiber intake to improve bowel regularity.  - Will consider starting Senna stool softener if constipation persists.  Offered to do KUB, pt denies at this time     CERVICAL PAIN:  - Mr. Joyce reports inconsistent neck pain that varies in location, attributed to work.  - Considered pinched nerve as potential cause.  - Ordered XR Neck to assess for potential causes of pain.    LOW BACK PAIN:  - Mr. Joyce reports lower back pain attributed to work.  - Considered pinched nerve as potential cause.  - Ordered XR Lower Back to assess for potential causes of pain.    NICOTINE DEPENDENCE:  - Mr. Joyce is a smoker.  - Acknowledged that smoking exacerbates symptoms.    OCULAR PAIN:  - Mr. Joyce reports pressure behind the eyes, which has improved.  - Reviewed CT results from ER visit, which showed no significant findings.  - Assessed for neurological causes of pressure sensation.  - Referred to ophthalmology for comprehensive eye exam.    INAPPROPRIATE DIET AND EATING HABITS:  - Mr. Joyce reports eating processed foods and fast food frequently over the last 3 weeks ([employer], [employer], and pizza).  - Discussed  how these dietary habits may contribute to current symptoms, particularly acid reflux.  - Recommend avoiding fried, cheesy, and greasy foods to help manage symptoms.    Pt states that at times he feels like he has problem with his circulation. Peripheral numbness? Will order xray of neck and lumbar? If unremarkable, recommend to follow up with his PCP      Care Plan/Goals: Reviewed    Goals    None         Follow up: No follow-ups on file.    After visit summary was printed and given to patient upon discharge today.  Patient goals and care plan are included in After Visit Summary.    This note was generated with the assistance of ambient listening technology. Verbal consent was obtained by the patient and accompanying visitor(s) for the recording of patient appointment to facilitate this note. I attest to having reviewed and edited the generated note for accuracy, though some syntax or spelling errors may persist. Please contact the author of this note for any clarification.            [1]   Social History  Socioeconomic History    Marital status:    Tobacco Use    Smoking status: Every Day     Current packs/day: 1.00     Average packs/day: 1 pack/day for 37.5 years (37.5 ttl pk-yrs)     Types: Cigarettes     Start date: 1/1/1988    Smokeless tobacco: Never    Tobacco comments:     Pack a day smoker   Substance and Sexual Activity    Alcohol use: No    Drug use: No    Sexual activity: Not Currently     Partners: Female     Birth control/protection: None     Social Drivers of Health     Financial Resource Strain: Low Risk  (11/12/2024)    Overall Financial Resource Strain (CARDIA)     Difficulty of Paying Living Expenses: Not hard at all   Food Insecurity: No Food Insecurity (11/12/2024)    Hunger Vital Sign     Worried About Running Out of Food in the Last Year: Never true     Ran Out of Food in the Last Year: Never true   Physical Activity: Sufficiently Active (11/12/2024)    Exercise Vital Sign     Days of  Exercise per Week: 4 days     Minutes of Exercise per Session: 60 min   Stress: No Stress Concern Present (11/12/2024)    [employer] [place] [employer] - Occupational Stress Questionnaire     Feeling of Stress : Not at all   Housing Stability: Unknown (11/12/2024)    Housing Stability Vital Sign     Unable to Pay for Housing in the Last Year: No   [2]   Patient Active Problem List  Diagnosis    Other chest pain    Lightheadedness    Other hyperlipidemia    Tobacco abuse    Palpitations    Nonobstructive atherosclerosis of coronary artery    Cerebral microvascular disease   [3]   Current Outpatient Medications on File Prior to Visit   Medication Sig Dispense Refill    aspirin (ECOTRIN) 81 MG EC tablet Take 81 mg by mouth once daily. (Patient not taking: Reported on 11/12/2024)      atorvastatin (LIPITOR) 40 MG tablet TAKE 1 TABLET(40 MG) BY MOUTH EVERY DAY 90 tablet 1    meclizine (ANTIVERT) 25 mg tablet Take 25 mg by mouth 3 (three) times daily as needed.      omeprazole magnesium (PRILOSEC ORAL) Take by mouth.       No current facility-administered medications on file prior to visit.

## 2025-07-18 ENCOUNTER — PATIENT MESSAGE (OUTPATIENT)
Dept: PULMONOLOGY | Facility: CLINIC | Age: 55
End: 2025-07-18
Payer: COMMERCIAL

## 2025-07-22 DIAGNOSIS — R00.2 PALPITATIONS: Primary | ICD-10-CM

## 2025-07-23 ENCOUNTER — OFFICE VISIT (OUTPATIENT)
Dept: CARDIOLOGY | Facility: CLINIC | Age: 55
End: 2025-07-23
Payer: COMMERCIAL

## 2025-07-23 ENCOUNTER — HOSPITAL ENCOUNTER (OUTPATIENT)
Dept: CARDIOLOGY | Facility: HOSPITAL | Age: 55
Discharge: HOME | End: 2025-07-23
Payer: COMMERCIAL

## 2025-07-23 VITALS
DIASTOLIC BLOOD PRESSURE: 76 MMHG | BODY MASS INDEX: 31.38 KG/M2 | SYSTOLIC BLOOD PRESSURE: 132 MMHG | WEIGHT: 194.44 LBS | HEART RATE: 79 BPM | OXYGEN SATURATION: 98 %

## 2025-07-23 DIAGNOSIS — R00.2 PALPITATIONS: ICD-10-CM

## 2025-07-23 DIAGNOSIS — E78.49 OTHER HYPERLIPIDEMIA: Primary | ICD-10-CM

## 2025-07-23 DIAGNOSIS — I25.10 NONOBSTRUCTIVE ATHEROSCLEROSIS OF CORONARY ARTERY: ICD-10-CM

## 2025-07-23 DIAGNOSIS — Z72.0 TOBACCO ABUSE: ICD-10-CM

## 2025-07-23 LAB
OHS QRS DURATION: 90 MS
OHS QTC CALCULATION: 432 MS

## 2025-07-23 PROCEDURE — 93010 ELECTROCARDIOGRAM REPORT: CPT | Mod: ,,, | Performed by: INTERNAL MEDICINE

## 2025-07-23 PROCEDURE — 3075F SYST BP GE 130 - 139MM HG: CPT | Mod: CPTII,S$GLB,,

## 2025-07-23 PROCEDURE — 99214 OFFICE O/P EST MOD 30 MIN: CPT | Mod: S$GLB,,,

## 2025-07-23 PROCEDURE — 99999 PR PBB SHADOW E&M-EST. PATIENT-LVL III: CPT | Mod: PBBFAC,,,

## 2025-07-23 PROCEDURE — 1160F RVW MEDS BY RX/DR IN RCRD: CPT | Mod: CPTII,S$GLB,,

## 2025-07-23 PROCEDURE — 93005 ELECTROCARDIOGRAM TRACING: CPT

## 2025-07-23 PROCEDURE — 3008F BODY MASS INDEX DOCD: CPT | Mod: CPTII,S$GLB,,

## 2025-07-23 PROCEDURE — 3078F DIAST BP <80 MM HG: CPT | Mod: CPTII,S$GLB,,

## 2025-07-23 PROCEDURE — 1159F MED LIST DOCD IN RCRD: CPT | Mod: CPTII,S$GLB,,

## 2025-07-23 NOTE — PROGRESS NOTES
Subjective:   Patient ID:  Lv Joyce is a 55 y.o. male who presents for evaluation of No chief complaint on file.      HPI  55-year-old male whose current medical conditions include HLD, tobacco abuse, nonobs CAD , here today for CV follow-up previously seen by Dr. Martínez.  Complaining of chronic reflux issues occasionally takes his Prilosec.  Pt had episode while driving feeling exhausted, felt like he dozed off while driving. HE WAS NOT DIZZY. He went to gas station to get coffee did not feel any better so he went to the ER workup unremarkable, has chronic GERD sxs so felt the coffee made it worse. He also had recently eaten bad food the previous few days and had not been on his prilosec, since he's been back on prilosec his GI sxs have improved. He doesn't not take it regularly.  Denies any chest pain or shortness of breath reports compliance with statin, LDL at goal    Tobacco 1ppd  FH none  Exercise stays active AC work    Lab Results   Component Value Date    HGBA1C 5.7 (H) 11/12/2024       Past Medical History:   Diagnosis Date    Hyperlipidemia     Nonobstructive atherosclerosis of coronary artery 1/7/2019    CINDY (obstructive sleep apnea) 12/23/2019       Past Surgical History:   Procedure Laterality Date    CARDIAC CATHETERIZATION      LEFT HEART CATHETERIZATION Left 11/9/2018    Procedure: CATHETERIZATION, HEART, LEFT;  Surgeon: Waylon Galvan MD;  Location: [facility] CATH LAB;  Service: Cardiology;  Laterality: Left;       Social History[1]    Family History   Problem Relation Name Age of Onset    Hyperlipidemia Mother Anjana Joyce     Hypertension Mother Anjana Joyce     Anxiety disorder Mother Anjana Joyce     Hyperlipidemia Father Jan Joyce     No Known Problems Brother      No Known Problems Brother         Medications Ordered Prior to Encounter[2]   Wt Readings from Last 3 Encounters:   07/23/25 88.2 kg (194 lb 7.1 oz)   07/10/25 88.1 kg (194 lb 3.6 oz)   11/12/24 87.6 kg (193  lb 2 oz)     Temp Readings from Last 3 Encounters:   07/10/25 97.9 °F (36.6 °C) (Tympanic)   11/12/24 (!) 95.7 °F (35.4 °C) (Tympanic)   07/09/24 98.2 °F (36.8 °C)     BP Readings from Last 3 Encounters:   07/23/25 132/76   07/10/25 136/84   11/12/24 128/78     Pulse Readings from Last 3 Encounters:   07/23/25 79   07/10/25 70   11/12/24 88        Review of Systems   Constitutional: Negative.   HENT: Negative.     Eyes: Negative.    Cardiovascular: Negative.    Respiratory: Negative.     Skin: Negative.    Musculoskeletal: Negative.    Gastrointestinal:  Positive for heartburn.   Genitourinary: Negative.    Neurological: Negative.    Psychiatric/Behavioral: Negative.         Objective:   Physical Exam  Vitals and nursing note reviewed.   Constitutional:       Appearance: Normal appearance.   HENT:      Head: Normocephalic and atraumatic.   Eyes:      General:         Right eye: No discharge.         Left eye: No discharge.      Pupils: Pupils are equal, round, and reactive to light.   Cardiovascular:      Rate and Rhythm: Normal rate and regular rhythm.      Heart sounds: S1 normal and S2 normal. No murmur heard.     No friction rub.   Pulmonary:      Effort: Pulmonary effort is normal. No respiratory distress.      Breath sounds: Normal breath sounds. No rales.   Abdominal:      Palpations: Abdomen is soft.      Tenderness: There is no abdominal tenderness.   Musculoskeletal:      Cervical back: Neck supple.      Right lower leg: No edema.      Left lower leg: No edema.   Skin:     General: Skin is warm and dry.   Neurological:      General: No focal deficit present.      Mental Status: He is alert and oriented to person, place, and time.   Psychiatric:         Mood and Affect: Mood normal.         Behavior: Behavior normal.         Thought Content: Thought content normal.         Lab Results   Component Value Date    CHOL 150 07/23/2024    CHOL 200 (H) 07/17/2023    CHOL 214 (H) 01/13/2022     Lab Results    Component Value Date    HDL 38 (L) 07/23/2024    HDL 38 (L) 07/17/2023    HDL 39 (L) 01/13/2022     Lab Results   Component Value Date    LDLCALC 90.0 07/23/2024    LDLCALC 135.2 07/17/2023    LDLCALC 144.4 01/13/2022     Lab Results   Component Value Date    TRIG 110 07/23/2024    TRIG 134 07/17/2023    TRIG 153 (H) 01/13/2022     Lab Results   Component Value Date    CHOLHDL 25.3 07/23/2024    CHOLHDL 19.0 (L) 07/17/2023    CHOLHDL 18.2 (L) 01/13/2022       Chemistry        Component Value Date/Time     07/09/2024 1545    K 4.3 07/09/2024 1545     07/09/2024 1545    CO2 20 (L) 07/09/2024 1545    BUN 11 07/09/2024 1545    CREATININE 0.9 07/09/2024 1545    GLU 83 07/09/2024 1545        Component Value Date/Time    CALCIUM 9.7 07/09/2024 1545    ALKPHOS 75 07/09/2024 1545    AST 17 07/09/2024 1545    ALT 23 07/09/2024 1545    BILITOT 0.5 07/09/2024 1545    ESTGFRAFRICA >60 03/21/2019 2005    EGFRNONAA >60 03/21/2019 2005          Lab Results   Component Value Date    TSH 1.172 01/13/2022     Lab Results   Component Value Date    INR 1 07/09/2025     @RESUFAST(WBC,HGB,HCT,MCV,PLT)  @LABRCNTIP(BNP,BNPTRIAGEBLO)@  CrCl cannot be calculated (Patient's most recent lab result is older than the maximum 7 days allowed.).     No results found for this or any previous visit.     No results found for this or any previous visit.     Assessment:      1. Other hyperlipidemia    2. Palpitations    3. Nonobstructive atherosclerosis of coronary artery    4. Tobacco abuse        Plan:   Other hyperlipidemia    Palpitations    Nonobstructive atherosclerosis of coronary artery    Tobacco abuse        Aspirin, statin-nonobstructive CAD   Statin, low-fat diet HLP   Risk factor modifications   Low-sodium, low-fat diet   Daily exercise as tolerated     Return to clinic in 6 months or sooner if needed  Annika Marcy, FNP-C  [facility], Cardiology         [1]   Social History  Tobacco Use    Smoking status: Every Day     Current  packs/day: 1.00     Average packs/day: 1 pack/day for 37.6 years (37.6 ttl pk-yrs)     Types: Cigarettes     Start date: 1/1/1988    Smokeless tobacco: Never    Tobacco comments:     Pack a day smoker   Substance Use Topics    Alcohol use: No    Drug use: No   [2]   Current Outpatient Medications on File Prior to Visit   Medication Sig Dispense Refill    aspirin (ECOTRIN) 81 MG EC tablet Take 81 mg by mouth once daily.      atorvastatin (LIPITOR) 40 MG tablet TAKE 1 TABLET(40 MG) BY MOUTH EVERY DAY 90 tablet 1    meclizine (ANTIVERT) 25 mg tablet Take 25 mg by mouth 3 (three) times daily as needed.      omeprazole magnesium (PRILOSEC ORAL) Take by mouth.       No current facility-administered medications on file prior to visit.